# Patient Record
Sex: MALE | Race: WHITE | URBAN - METROPOLITAN AREA
[De-identification: names, ages, dates, MRNs, and addresses within clinical notes are randomized per-mention and may not be internally consistent; named-entity substitution may affect disease eponyms.]

---

## 2024-11-07 ENCOUNTER — APPOINTMENT (OUTPATIENT)
Dept: RADIOLOGY | Facility: HOSPITAL | Age: 59
End: 2024-11-07
Payer: COMMERCIAL

## 2024-11-07 ENCOUNTER — HOSPITAL ENCOUNTER (EMERGENCY)
Facility: HOSPITAL | Age: 59
Discharge: HOME | End: 2024-11-07
Attending: EMERGENCY MEDICINE
Payer: COMMERCIAL

## 2024-11-07 ENCOUNTER — APPOINTMENT (OUTPATIENT)
Dept: CARDIOLOGY | Facility: HOSPITAL | Age: 59
End: 2024-11-07
Payer: COMMERCIAL

## 2024-11-07 VITALS
SYSTOLIC BLOOD PRESSURE: 141 MMHG | TEMPERATURE: 97.2 F | BODY MASS INDEX: 2.8 KG/M2 | RESPIRATION RATE: 13 BRPM | HEIGHT: 71 IN | HEART RATE: 98 BPM | DIASTOLIC BLOOD PRESSURE: 76 MMHG | WEIGHT: 20 LBS | OXYGEN SATURATION: 94 %

## 2024-11-07 DIAGNOSIS — R07.9 LEFT-SIDED CHEST PAIN: Primary | ICD-10-CM

## 2024-11-07 DIAGNOSIS — J40 BRONCHITIS: ICD-10-CM

## 2024-11-07 DIAGNOSIS — R05.1 ACUTE COUGH: ICD-10-CM

## 2024-11-07 LAB
ALBUMIN SERPL BCP-MCNC: 5 G/DL (ref 3.4–5)
ALP SERPL-CCNC: 58 U/L (ref 33–120)
ALT SERPL W P-5'-P-CCNC: 45 U/L (ref 10–52)
ANION GAP SERPL CALC-SCNC: 14 MMOL/L (ref 10–20)
AST SERPL W P-5'-P-CCNC: 37 U/L (ref 9–39)
ATRIAL RATE: 86 BPM
BASOPHILS # BLD AUTO: 0.05 X10*3/UL (ref 0–0.1)
BASOPHILS NFR BLD AUTO: 0.7 %
BILIRUB SERPL-MCNC: 1.2 MG/DL (ref 0–1.2)
BNP SERPL-MCNC: 15 PG/ML (ref 0–99)
BUN SERPL-MCNC: 19 MG/DL (ref 6–23)
CALCIUM SERPL-MCNC: 10.6 MG/DL (ref 8.6–10.3)
CARDIAC TROPONIN I PNL SERPL HS: 6 NG/L (ref 0–20)
CARDIAC TROPONIN I PNL SERPL HS: 6 NG/L (ref 0–20)
CHLORIDE SERPL-SCNC: 94 MMOL/L (ref 98–107)
CO2 SERPL-SCNC: 29 MMOL/L (ref 21–32)
CREAT SERPL-MCNC: 0.88 MG/DL (ref 0.5–1.3)
EGFRCR SERPLBLD CKD-EPI 2021: >90 ML/MIN/1.73M*2
EOSINOPHIL # BLD AUTO: 0.08 X10*3/UL (ref 0–0.7)
EOSINOPHIL NFR BLD AUTO: 1.1 %
ERYTHROCYTE [DISTWIDTH] IN BLOOD BY AUTOMATED COUNT: 12.4 % (ref 11.5–14.5)
GLUCOSE SERPL-MCNC: 124 MG/DL (ref 74–99)
HCT VFR BLD AUTO: 49.7 % (ref 41–52)
HGB BLD-MCNC: 17.5 G/DL (ref 13.5–17.5)
IMM GRANULOCYTES # BLD AUTO: 0.02 X10*3/UL (ref 0–0.7)
IMM GRANULOCYTES NFR BLD AUTO: 0.3 % (ref 0–0.9)
INR PPP: 1 (ref 0.9–1.1)
LYMPHOCYTES # BLD AUTO: 1.68 X10*3/UL (ref 1.2–4.8)
LYMPHOCYTES NFR BLD AUTO: 23.5 %
MAGNESIUM SERPL-MCNC: 1.81 MG/DL (ref 1.6–2.4)
MCH RBC QN AUTO: 33.7 PG (ref 26–34)
MCHC RBC AUTO-ENTMCNC: 35.2 G/DL (ref 32–36)
MCV RBC AUTO: 96 FL (ref 80–100)
MONOCYTES # BLD AUTO: 0.96 X10*3/UL (ref 0.1–1)
MONOCYTES NFR BLD AUTO: 13.4 %
NEUTROPHILS # BLD AUTO: 4.36 X10*3/UL (ref 1.2–7.7)
NEUTROPHILS NFR BLD AUTO: 61 %
NRBC BLD-RTO: 0 /100 WBCS (ref 0–0)
P AXIS: 66 DEGREES
P OFFSET: 174 MS
P ONSET: 128 MS
PLATELET # BLD AUTO: 168 X10*3/UL (ref 150–450)
POTASSIUM SERPL-SCNC: 4.1 MMOL/L (ref 3.5–5.3)
PR INTERVAL: 184 MS
PROT SERPL-MCNC: 8.2 G/DL (ref 6.4–8.2)
PROTHROMBIN TIME: 11.2 SECONDS (ref 9.8–12.8)
Q ONSET: 220 MS
QRS COUNT: 14 BEATS
QRS DURATION: 82 MS
QT INTERVAL: 356 MS
QTC CALCULATION(BAZETT): 426 MS
QTC FREDERICIA: 401 MS
R AXIS: 60 DEGREES
RBC # BLD AUTO: 5.2 X10*6/UL (ref 4.5–5.9)
SODIUM SERPL-SCNC: 133 MMOL/L (ref 136–145)
T AXIS: 64 DEGREES
T OFFSET: 398 MS
VENTRICULAR RATE: 86 BPM
WBC # BLD AUTO: 7.2 X10*3/UL (ref 4.4–11.3)

## 2024-11-07 PROCEDURE — 83735 ASSAY OF MAGNESIUM: CPT | Performed by: EMERGENCY MEDICINE

## 2024-11-07 PROCEDURE — 85025 COMPLETE CBC W/AUTO DIFF WBC: CPT | Performed by: EMERGENCY MEDICINE

## 2024-11-07 PROCEDURE — 71045 X-RAY EXAM CHEST 1 VIEW: CPT

## 2024-11-07 PROCEDURE — 84075 ASSAY ALKALINE PHOSPHATASE: CPT | Performed by: EMERGENCY MEDICINE

## 2024-11-07 PROCEDURE — 2500000004 HC RX 250 GENERAL PHARMACY W/ HCPCS (ALT 636 FOR OP/ED): Performed by: REGISTERED NURSE

## 2024-11-07 PROCEDURE — 99284 EMERGENCY DEPT VISIT MOD MDM: CPT | Mod: 25

## 2024-11-07 PROCEDURE — 85610 PROTHROMBIN TIME: CPT | Performed by: EMERGENCY MEDICINE

## 2024-11-07 PROCEDURE — 83880 ASSAY OF NATRIURETIC PEPTIDE: CPT | Performed by: EMERGENCY MEDICINE

## 2024-11-07 PROCEDURE — 36415 COLL VENOUS BLD VENIPUNCTURE: CPT | Performed by: EMERGENCY MEDICINE

## 2024-11-07 PROCEDURE — 96374 THER/PROPH/DIAG INJ IV PUSH: CPT

## 2024-11-07 PROCEDURE — 96375 TX/PRO/DX INJ NEW DRUG ADDON: CPT

## 2024-11-07 PROCEDURE — 93005 ELECTROCARDIOGRAM TRACING: CPT

## 2024-11-07 PROCEDURE — 84484 ASSAY OF TROPONIN QUANT: CPT | Performed by: EMERGENCY MEDICINE

## 2024-11-07 PROCEDURE — 84484 ASSAY OF TROPONIN QUANT: CPT | Mod: 91 | Performed by: EMERGENCY MEDICINE

## 2024-11-07 PROCEDURE — 71045 X-RAY EXAM CHEST 1 VIEW: CPT | Performed by: RADIOLOGY

## 2024-11-07 RX ORDER — GUAIFENESIN 600 MG/1
1200 TABLET, EXTENDED RELEASE ORAL 2 TIMES DAILY
Qty: 20 TABLET | Refills: 0 | Status: SHIPPED | OUTPATIENT
Start: 2024-11-07 | End: 2024-11-12

## 2024-11-07 RX ORDER — METHYLPREDNISOLONE 4 MG/1
TABLET ORAL
Qty: 21 TABLET | Refills: 0 | Status: SHIPPED | OUTPATIENT
Start: 2024-11-07

## 2024-11-07 RX ORDER — ALBUTEROL SULFATE 90 UG/1
1-2 INHALANT RESPIRATORY (INHALATION) EVERY 6 HOURS PRN
Qty: 1 G | Refills: 0 | Status: SHIPPED | OUTPATIENT
Start: 2024-11-07 | End: 2024-12-07

## 2024-11-07 RX ORDER — IPRATROPIUM BROMIDE AND ALBUTEROL SULFATE 2.5; .5 MG/3ML; MG/3ML
3 SOLUTION RESPIRATORY (INHALATION) EVERY 20 MIN
Status: DISPENSED | OUTPATIENT
Start: 2024-11-07 | End: 2024-11-07

## 2024-11-07 RX ORDER — MORPHINE SULFATE 4 MG/ML
4 INJECTION, SOLUTION INTRAMUSCULAR; INTRAVENOUS ONCE
Status: COMPLETED | OUTPATIENT
Start: 2024-11-07 | End: 2024-11-07

## 2024-11-07 RX ORDER — OXYCODONE AND ACETAMINOPHEN 5; 325 MG/1; MG/1
1 TABLET ORAL EVERY 6 HOURS PRN
Qty: 3 TABLET | Refills: 0 | Status: SHIPPED | OUTPATIENT
Start: 2024-11-07 | End: 2024-11-10

## 2024-11-07 RX ORDER — AZITHROMYCIN 250 MG/1
TABLET, FILM COATED ORAL
Qty: 6 TABLET | Refills: 0 | Status: SHIPPED | OUTPATIENT
Start: 2024-11-07 | End: 2024-11-12

## 2024-11-07 RX ORDER — ONDANSETRON HYDROCHLORIDE 2 MG/ML
4 INJECTION, SOLUTION INTRAVENOUS ONCE
Status: COMPLETED | OUTPATIENT
Start: 2024-11-07 | End: 2024-11-07

## 2024-11-07 ASSESSMENT — PAIN DESCRIPTION - PAIN TYPE: TYPE: ACUTE PAIN

## 2024-11-07 ASSESSMENT — PAIN SCALES - GENERAL: PAINLEVEL_OUTOF10: 5 - MODERATE PAIN

## 2024-11-07 ASSESSMENT — HEART SCORE
HISTORY: SLIGHTLY SUSPICIOUS
RISK FACTORS: >2 RISK FACTORS OR HX OF ATHEROSCLEROTIC DISEASE
HEART SCORE: 3
AGE: 45-64
ECG: NORMAL
TROPONIN: LESS THAN OR EQUAL TO NORMAL LIMIT

## 2024-11-07 ASSESSMENT — PAIN DESCRIPTION - ONSET: ONSET: SUDDEN

## 2024-11-07 ASSESSMENT — COLUMBIA-SUICIDE SEVERITY RATING SCALE - C-SSRS
6. HAVE YOU EVER DONE ANYTHING, STARTED TO DO ANYTHING, OR PREPARED TO DO ANYTHING TO END YOUR LIFE?: NO
1. IN THE PAST MONTH, HAVE YOU WISHED YOU WERE DEAD OR WISHED YOU COULD GO TO SLEEP AND NOT WAKE UP?: NO
2. HAVE YOU ACTUALLY HAD ANY THOUGHTS OF KILLING YOURSELF?: NO

## 2024-11-07 ASSESSMENT — PAIN DESCRIPTION - FREQUENCY: FREQUENCY: INTERMITTENT

## 2024-11-07 ASSESSMENT — PAIN - FUNCTIONAL ASSESSMENT: PAIN_FUNCTIONAL_ASSESSMENT: 0-10

## 2024-11-07 ASSESSMENT — PAIN DESCRIPTION - ORIENTATION: ORIENTATION: LEFT

## 2024-11-07 ASSESSMENT — PAIN DESCRIPTION - LOCATION: LOCATION: CHEST

## 2024-11-07 ASSESSMENT — PAIN DESCRIPTION - DESCRIPTORS: DESCRIPTORS: STABBING

## 2024-11-07 ASSESSMENT — PAIN DESCRIPTION - PROGRESSION: CLINICAL_PROGRESSION: NOT CHANGED

## 2024-11-07 NOTE — Clinical Note
Ankur Sanchez was seen and treated in our emergency department on 11/7/2024.  He may return to work on 11/11/2024.       If you have any questions or concerns, please don't hesitate to call.      Karen Zaidi, APRN-CNP

## 2024-11-07 NOTE — PROGRESS NOTES
Respiratory Therapy Note  Patient refusing all breathing tx at this time, states he cannot cough the pain is too bad. Vital signs are good and patient is on room air with diminished breath sounds

## 2024-11-07 NOTE — ED PROVIDER NOTES
HPI   Chief Complaint   Patient presents with    Chest Pain     States he thinks he pulled a muscle in his chest.  Left sided chest pain.     59-year-old male with past medical history significant for hypertension, hyperlipidemia, diabetes, and 1.5 pack/day smoker presents emergency department today for evaluation of left-sided chest pain.  Patient tells me he was coughing on Monday when he started to feel a sharp burning sensation in the left side of his chest.  Patient tells me he is continued to experience this pain with movement, sneezing, and deep inhalation.  Patient denies fever or chills.  Patient tells me he has a moist nonproductive cough.  He tells me he feels like he cannot get the mucus out.  Patient denies shortness of breath.  Patient denies history of COPD and tells me he does not use inhalers or nebulizer treatments.  Patient currently rates his pain at a 5/10 on the pain scale.  Patient denies headache, dizziness or lightheadedness, numbness or tingling, abdominal pain, nausea vomiting or diarrhea.      History provided by:  Patient and spouse   used: No            Patient History   Past Medical History:   Diagnosis Date    Diabetes mellitus (Multi)     Hypertension      History reviewed. No pertinent surgical history.  No family history on file.  Social History     Tobacco Use    Smoking status: Every Day     Current packs/day: 2.00     Types: Cigarettes    Smokeless tobacco: Never   Vaping Use    Vaping status: Never Used   Substance Use Topics    Alcohol use: Defer    Drug use: Never       Physical Exam   ED Triage Vitals [11/07/24 0931]   Temperature Heart Rate Respirations BP   36.2 °C (97.2 °F) 85 20 147/85      Pulse Ox Temp Source Heart Rate Source Patient Position   98 % Temporal Monitor Sitting      BP Location FiO2 (%)     Right arm --       Physical Exam  Vitals and nursing note reviewed.   Constitutional:       Appearance: He is well-developed.   HENT:      Head:  Normocephalic and atraumatic.   Eyes:      Extraocular Movements: Extraocular movements intact.      Pupils: Pupils are equal, round, and reactive to light.   Cardiovascular:      Rate and Rhythm: Normal rate and regular rhythm.   Pulmonary:      Effort: Pulmonary effort is normal.      Breath sounds: Decreased breath sounds and rhonchi present.   Musculoskeletal:         General: Normal range of motion.      Cervical back: Normal range of motion and neck supple.   Skin:     General: Skin is warm and dry.      Capillary Refill: Capillary refill takes less than 2 seconds.   Neurological:      General: No focal deficit present.      Mental Status: He is alert and oriented to person, place, and time.   Psychiatric:         Mood and Affect: Mood normal.         Behavior: Behavior normal.           ED Course & MDM   Diagnoses as of 11/07/24 1228   Left-sided chest pain   Acute cough   Bronchitis                 No data recorded     [place] Coma Scale Score: 15 (11/07/24 0922 : Chuck Nguyen RN) HEART Score: 3 (11/07/24 1117 : ISELA Castro-JESS)                         Medical Decision Making    Patient seen exam emergency department; patient is nontoxic in appearance and does not appear to be any acute distress.  Patient is alert and oriented x 4.  Patient is neurologically intact without any focal deficits.  Patient's lung sounds are diminished throughout with scattered rhonchi in the bases.  Patient's heart regular rate and rhythm without any murmur appreciated.  Patient's abdomen is soft round and nontender.  Patient is afebrile not tachycardic.    Given the location of patient's pain and his medical history we will order chest x-ray, EKG and troponin to rule out acute ACS.  X-ray will also evaluate for pneumonia.  Given that patient's lungs are with rhonchi and diminished will order nebulizing treatments.  Additionally patient given IV morphine and IV Zofran for his pain.    EKG at 09:26 with  ventricular rate of 86, as interpreted by me, shows a normal rate and rhythm, normal axis, normal intervals. And normal ST and T wave pattern with no evidence of acute ischemia or other acute findings    Patient's high sensitive troponins are negative x 2.  Patient's heart score is 3.  BNP is 15, CMP significant for slight hyponatremia with a sodium of 133.  Mag is 1.81.  PT/INR is 11.2/1.  CBC is unremarkable on my review.  Chest x-ray is without evidence of acute cardiopulmonary processes.    Upon reevaluation patient is still complaining of discomfort in his chest with movement.  Patient refused his DuoNebs stating that that would make him cough which worsens his pain.  Patient I discussed that his symptoms today are multifactorial.  I do think that he pulled a muscle coughing or sneezing in his chest however I do feel that he has an undiagnosed COPD.  Patient I discussed treatment of this at home which will include a Medrol Dosepak, albuterol inhaler, Mucinex, and azithromycin.  Additionally I will give him a short course of Percocet for pain management at home.  I did recommend that he reach out to his primary care physician for reevaluation next week.  We also discussed reasons to return to the emergency department which she verbalized understanding of.  Patient provided with work excuse for today and tomorrow.  All patient's questions and concerns were addressed prior to discharge.  Patient discharged home in stable condition.        Shared KENNEY Attestation:    This patient was seen by the advanced practice provider.  I personally saw the patient and made/approved the management plan and take responsibility for the patient management.    History: 59-year-old male presents with chest pain.    Exam: Regular rate and rhythm cardiac exam with clear breath sounds bilaterally.  Abdomen is soft and nontender.  There is mild tenderness to palpation across the chest.  Negative Homans' sign bilaterally.  Neurological  exam is grossly intact.    MDM: ACS, arrhythmia, pneumonia, muscle strain, GERD    Labs Reviewed   COMPREHENSIVE METABOLIC PANEL - Abnormal       Result Value    Glucose 124 (*)     Sodium 133 (*)     Potassium 4.1      Chloride 94 (*)     Bicarbonate 29      Anion Gap 14      Urea Nitrogen 19      Creatinine 0.88      eGFR >90      Calcium 10.6 (*)     Albumin 5.0      Alkaline Phosphatase 58      Total Protein 8.2      AST 37      Bilirubin, Total 1.2      ALT 45     MAGNESIUM - Normal    Magnesium 1.81     B-TYPE NATRIURETIC PEPTIDE - Normal    BNP 15      Narrative:        <100 pg/mL - Heart failure unlikely  100-299 pg/mL - Intermediate probability of acute heart                  failure exacerbation. Correlate with clinical                  context and patient history.    >=300 pg/mL - Heart Failure likely. Correlate with clinical                  context and patient history.    BNP testing is performed using different testing methodology at [facility] than at other [facility]. Direct result comparisons should only be made within the same method.      PROTIME-INR - Normal    Protime 11.2      INR 1.0     SERIAL TROPONIN-INITIAL - Normal    Troponin I, High Sensitivity 6      Narrative:     Less than 99th percentile of normal range cutoff-  Female and children under 18 years old <14 ng/L; Male <21 ng/L: Negative  Repeat testing should be performed if clinically indicated.     Female and children under 18 years old 14-50 ng/L; Male 21-50 ng/L:  Consistent with possible cardiac damage and possible increased clinical   risk. Serial measurements may help to assess extent of myocardial damage.     >50 ng/L: Consistent with cardiac damage, increased clinical risk and  myocardial infarction. Serial measurements may help assess extent of   myocardial damage.      NOTE: Children less than 1 year old may have higher baseline troponin   levels and results should be interpreted in conjunction with the  overall   clinical context.     NOTE: Troponin I testing is performed using a different   testing methodology at [facility] than at other   [facility]. Direct result comparisons should only   be made within the same method.   SERIAL TROPONIN, 1 HOUR - Normal    Troponin I, High Sensitivity 6      Narrative:     Less than 99th percentile of normal range cutoff-  Female and children under 18 years old <14 ng/L; Male <21 ng/L: Negative  Repeat testing should be performed if clinically indicated.     Female and children under 18 years old 14-50 ng/L; Male 21-50 ng/L:  Consistent with possible cardiac damage and possible increased clinical   risk. Serial measurements may help to assess extent of myocardial damage.     >50 ng/L: Consistent with cardiac damage, increased clinical risk and  myocardial infarction. Serial measurements may help assess extent of   myocardial damage.      NOTE: Children less than 1 year old may have higher baseline troponin   levels and results should be interpreted in conjunction with the overall   clinical context.     NOTE: Troponin I testing is performed using a different   testing methodology at [facility] than at other   [facility]. Direct result comparisons should only   be made within the same method.   TROPONIN SERIES- (INITIAL, 1 HR)    Narrative:     The following orders were created for panel order Troponin I Series, High Sensitivity (0, 1 HR).  Procedure                               Abnormality         Status                     ---------                               -----------         ------                     Troponin I, High Sensiti...[[id number]]  Normal              Final result               Troponin, High Sensitivi...[[id number]]  Normal              Final result                 Please view results for these tests on the individual orders.   CBC WITH AUTO DIFFERENTIAL    WBC 7.2      nRBC 0.0      RBC 5.20      Hemoglobin 17.5       "Hematocrit 49.7      MCV 96      MCH 33.7      MCHC 35.2      RDW 12.4      Platelets 168      Neutrophils % 61.0      Immature Granulocytes %, Automated 0.3      Lymphocytes % 23.5      Monocytes % 13.4      Eosinophils % 1.1      Basophils % 0.7      Neutrophils Absolute 4.36      Immature Granulocytes Absolute, Automated 0.02      Lymphocytes Absolute 1.68      Monocytes Absolute 0.96      Eosinophils Absolute 0.08      Basophils Absolute 0.05         XR chest 1 view   Final Result   1.  No evidence of acute cardiopulmonary process.                  MACRO:   None        Signed by: Latisha Thompson 11/7/2024 11:08 AM   Dictation workstation:   [id number]        My interpretation of EKG is normal sinus rhythm 86 bpm with no acute ST-T changes    I utilized an evidence-based risk rating tool (CMT) along with my training and experience to weigh the risk of discharge against the risks of further testing, imaging, or hospitalization. At this time I estimate the risks of additional testing, imaging, or hospitalization to be equal to or greater than the risk of discharge. I discussed my risk assessment with the patient and the patient consents to the risk of discharge as well as the risk of uncertainty in estimating outcomes.    The patient's HEART Score is <4. In rare cases, I give patients with HEART Score of 4 the option of discharge, but only when they meet criteria for \"Low 4,\" meaning that HST was used, and the 4 is not from a highly suspicious story, highly suspicious EKG, or positive cardiac enzymes. In these selected cases, the risk of a \"Low 4\" is still most likely lower than the risk of admission and further testing/imaging. [id number]          I have seen and examined the patient, agree with the workup, evaluation, medical decision making, management and diagnosis.  The care plan has been discussed.    Cricket Estevez MD      Procedure  Procedures     Karen Zaidi, APRN-CNP  11/07/24 1233    "

## 2024-11-15 LAB
ATRIAL RATE: 86 BPM
P AXIS: 66 DEGREES
P OFFSET: 174 MS
P ONSET: 128 MS
PR INTERVAL: 184 MS
Q ONSET: 220 MS
QRS COUNT: 14 BEATS
QRS DURATION: 82 MS
QT INTERVAL: 356 MS
QTC CALCULATION(BAZETT): 426 MS
QTC FREDERICIA: 401 MS
R AXIS: 60 DEGREES
T AXIS: 64 DEGREES
T OFFSET: 398 MS
VENTRICULAR RATE: 86 BPM

## 2025-04-18 ENCOUNTER — APPOINTMENT (OUTPATIENT)
Dept: RADIOLOGY | Facility: HOSPITAL | Age: 60
End: 2025-04-18
Payer: COMMERCIAL

## 2025-04-18 ENCOUNTER — HOSPITAL ENCOUNTER (EMERGENCY)
Facility: HOSPITAL | Age: 60
Discharge: HOME | End: 2025-04-18
Attending: EMERGENCY MEDICINE
Payer: COMMERCIAL

## 2025-04-18 VITALS
WEIGHT: 185 LBS | HEART RATE: 109 BPM | TEMPERATURE: 97.9 F | BODY MASS INDEX: 25.9 KG/M2 | DIASTOLIC BLOOD PRESSURE: 88 MMHG | RESPIRATION RATE: 20 BRPM | HEIGHT: 71 IN | OXYGEN SATURATION: 96 % | SYSTOLIC BLOOD PRESSURE: 155 MMHG

## 2025-04-18 DIAGNOSIS — S93.325A DISLOCATION OF TARSOMETATARSAL JOINT OF LEFT FOOT, INITIAL ENCOUNTER: Primary | ICD-10-CM

## 2025-04-18 PROCEDURE — 73630 X-RAY EXAM OF FOOT: CPT | Mod: LT

## 2025-04-18 PROCEDURE — 96372 THER/PROPH/DIAG INJ SC/IM: CPT | Performed by: EMERGENCY MEDICINE

## 2025-04-18 PROCEDURE — 73700 CT LOWER EXTREMITY W/O DYE: CPT | Mod: LT

## 2025-04-18 PROCEDURE — 73610 X-RAY EXAM OF ANKLE: CPT | Mod: LT

## 2025-04-18 PROCEDURE — 99284 EMERGENCY DEPT VISIT MOD MDM: CPT | Performed by: EMERGENCY MEDICINE

## 2025-04-18 PROCEDURE — 73700 CT LOWER EXTREMITY W/O DYE: CPT | Mod: LEFT SIDE | Performed by: RADIOLOGY

## 2025-04-18 PROCEDURE — 29515 APPLICATION SHORT LEG SPLINT: CPT | Mod: LT

## 2025-04-18 PROCEDURE — 2500000004 HC RX 250 GENERAL PHARMACY W/ HCPCS (ALT 636 FOR OP/ED): Mod: JZ | Performed by: EMERGENCY MEDICINE

## 2025-04-18 PROCEDURE — 2500000001 HC RX 250 WO HCPCS SELF ADMINISTERED DRUGS (ALT 637 FOR MEDICARE OP): Performed by: EMERGENCY MEDICINE

## 2025-04-18 PROCEDURE — 73610 X-RAY EXAM OF ANKLE: CPT | Mod: LEFT SIDE | Performed by: RADIOLOGY

## 2025-04-18 PROCEDURE — 73630 X-RAY EXAM OF FOOT: CPT | Mod: LEFT SIDE | Performed by: RADIOLOGY

## 2025-04-18 RX ORDER — OXYCODONE AND ACETAMINOPHEN 5; 325 MG/1; MG/1
1 TABLET ORAL EVERY 6 HOURS PRN
Qty: 5 TABLET | Refills: 0 | Status: SHIPPED | OUTPATIENT
Start: 2025-04-18 | End: 2025-04-21

## 2025-04-18 RX ORDER — MORPHINE SULFATE 4 MG/ML
4 INJECTION, SOLUTION INTRAMUSCULAR; INTRAVENOUS ONCE
Status: COMPLETED | OUTPATIENT
Start: 2025-04-18 | End: 2025-04-18

## 2025-04-18 RX ORDER — HYDROCODONE BITARTRATE AND ACETAMINOPHEN 5; 325 MG/1; MG/1
1 TABLET ORAL ONCE
Refills: 0 | Status: COMPLETED | OUTPATIENT
Start: 2025-04-18 | End: 2025-04-18

## 2025-04-18 RX ADMIN — MORPHINE SULFATE 4 MG: 4 INJECTION, SOLUTION INTRAMUSCULAR; INTRAVENOUS at 09:14

## 2025-04-18 RX ADMIN — HYDROCODONE BITARTRATE AND ACETAMINOPHEN 1 TABLET: 5; 325 TABLET ORAL at 07:49

## 2025-04-18 ASSESSMENT — PAIN DESCRIPTION - DESCRIPTORS
DESCRIPTORS: POUNDING

## 2025-04-18 ASSESSMENT — PAIN DESCRIPTION - PAIN TYPE
TYPE: ACUTE PAIN

## 2025-04-18 ASSESSMENT — LIFESTYLE VARIABLES
HAVE PEOPLE ANNOYED YOU BY CRITICIZING YOUR DRINKING: NO
TOTAL SCORE: 1
EVER HAD A DRINK FIRST THING IN THE MORNING TO STEADY YOUR NERVES TO GET RID OF A HANGOVER: NO
HAVE YOU EVER FELT YOU SHOULD CUT DOWN ON YOUR DRINKING: YES
EVER FELT BAD OR GUILTY ABOUT YOUR DRINKING: NO

## 2025-04-18 ASSESSMENT — PAIN DESCRIPTION - LOCATION
LOCATION: FOOT
LOCATION: ANKLE

## 2025-04-18 ASSESSMENT — PAIN DESCRIPTION - ORIENTATION
ORIENTATION: LEFT

## 2025-04-18 ASSESSMENT — PAIN SCALES - GENERAL
PAINLEVEL_OUTOF10: 10 - WORST POSSIBLE PAIN
PAINLEVEL_OUTOF10: 9
PAINLEVEL_OUTOF10: 10 - WORST POSSIBLE PAIN
PAINLEVEL_OUTOF10: 8

## 2025-04-18 ASSESSMENT — PAIN - FUNCTIONAL ASSESSMENT
PAIN_FUNCTIONAL_ASSESSMENT: 0-10

## 2025-04-18 ASSESSMENT — PAIN DESCRIPTION - PROGRESSION
CLINICAL_PROGRESSION: NOT CHANGED
CLINICAL_PROGRESSION: GRADUALLY IMPROVING

## 2025-04-18 NOTE — ED PROVIDER NOTES
HPI   Chief Complaint   Patient presents with    Foot Injury     Pt was getting out of bed last time and got tangled up with his dog that was on the floor and pt ended up falling down stating that his foot went a different way when falling down. -blood thinner, -loc. States hx of neuropathy        HPI: 59-year-old male history of diabetes and neuropathy states that he went to get out of bed and he tripped over his dog and rolled his left ankle.  He is complaining of left lateral ankle pain.  He did not hit his head.  No LOC.  No neck or back pain.  No blood thinners.    Family HX: Denies any significant/pertinent family history.  Social Hx: Denies ETOH or drug use.  Review of systems:  Gen.: No weight loss, fatigue, anorexia, insomnia, fever.   Eyes: No vision loss, double vision, drainage, eye pain.   ENT: No pharyngitis, dry mouth.   Cardiac: No chest pain, palpitations, syncope, near syncope.   Pulmonary: No shortness of breath, cough, hemoptysis.   Heme/lymph: No swollen glands, fever, bleeding.   GI: No abdominal pain, change in bowel habits, melena, hematemesis, hematochezia, nausea, vomiting, diarrhea.   : No discharge, dysuria, frequency, urgency, hematuria.   Musculoskeletal: No limb pain, joint pain, joint swelling.   Skin: No rashes.   Psych: No depression, anxiety, suicidality, homicidality.   Review of systems is otherwise negative unless stated above or in history of present illness.    Physical Exam:    Appearance: Alert, oriented , cooperative,  in no acute distress. Well nourished & well hydrated.    Skin: Intact,  dry skin, no lesions, rash, petechiae or purpura.     Eyes: PERRLA, EOMs intact,  Conjunctiva pink with no redness or exudates. Eyelids without lesions. No scleral icterus.     ENT: Hearing grossly intact. External auditory canals patent, tympanic membranes intact with visible landmarks. Nares patent, mucus membranes moist. Dentition without lesions. Pharynx clear, uvula midline.      Neck: Supple, without meningismus. Thyroid not palpable. Trachea at midline. No lymphadenopathy.    Pulmonary: Clear bilaterally with good chest wall excursion. No rales, rhonchi or wheezing. No accessory muscle use or stridor.    Cardiac: Normal S1, S2 without murmur, rub, gallop or extrasystole. No JVD, Carotids without bruits.    Abdomen: Soft, nontender, active bowel sounds.  No palpable organomegaly.  No rebound or guarding.  No CVA tenderness.    Genitourinary: Exam deferred.    Musculoskeletal:  no deformity. Pulses full and equal. No cyanosis, clubbing, Left ankle with edema about the lateral malleolus.  2+ pulses.  Skin is pink    Neurological:  Cranial nerves II through XII are grossly intact, finger-nose touch is normal, normal sensation, no weakness, no focal findings identified.    Psychiatric: Appropriate mood and affect.     Medical Decision-Making:  Testing: Patient was given Norco for pain.  We will obtain x-rays of the foot and ankle to rule out fracture versus dislocation.  X-ray was suggesting a Lisfranc injury.  They recommended a CT.  CT was obtained which does show a Lisfranc fracture/dislocation.  Patient was medicated for pain with Norco and then with subcu morphine.  Patient was discussed with Dr. Villar with podiatry.  Patient was placed in a short leg posterior splint.  He requested a walker.  Patient was given pain medication with sedation precaution.  Patient will be followed up in Dr. Chanel's office.  No other injuries were noted.  Patient did have a short leg posterior splint placed.  This was placed by the nursing staff.  Patient was neurovascularly intact before and after splint placement  Treatment:   Reevaluation:   Plan: Homegoing. Discussed differential. Will follow-up with the primary physician in the next 2-3 days. Return if worse. They understand return precautions and discharge instructions. Patient and family/friend/caregiver are in agreement with this plan.    Impression:   1.  Lisfranc injury  2.                  Patient History   Medical History[1]  Surgical History[2]  Family History[3]  Social History[4]    Physical Exam   ED Triage Vitals [04/18/25 0702]   Temperature Heart Rate Respirations BP   36.6 °C (97.9 °F) (!) 109 20 155/88      Pulse Ox Temp Source Heart Rate Source Patient Position   96 % Temporal -- Sitting      BP Location FiO2 (%)     Right arm --       Physical Exam      ED Course & MDM   Diagnoses as of 04/18/25 1420   Dislocation of tarsometatarsal joint of left foot, initial encounter                 No data recorded     [place] Coma Scale Score: 15 (04/18/25 0706 : Monica Rodrigez RN)                           Medical Decision Making      Procedure  Procedures       [1]   Past Medical History:  Diagnosis Date    Diabetes mellitus (Multi)     Hypertension    [2] No past surgical history on file.  [3] No family history on file.  [4]   Social History  Tobacco Use    Smoking status: Every Day     Current packs/day: 2.00     Types: Cigarettes    Smokeless tobacco: Never   Vaping Use    Vaping status: Never Used   Substance Use Topics    Alcohol use: Defer    Drug use: Never        Angelina Jaime MD  04/18/25 1420

## 2025-04-18 NOTE — ED PROCEDURE NOTE
Procedure  Orthopaedic Injury Treatment - Lower Extremity    Performed by: Angelina Jaime MD  Authorized by: Angelina Jaime MD    Consent:     Consent obtained:  Verbal    Consent given by:  Patient    Risks discussed:  Fracture    Alternatives discussed:  No treatment  Universal protocol:     Imaging studies available: yes      Patient identity confirmed:  Verbally with patient, hospital-assigned identification number and arm band  Location:     Injury location: foot.  Pre-procedure details:     Distal perfusion: normal    Sedation:     Sedation type:  None  Procedure details:     Manipulation performed: no      Immobilization:  Splint    Splint type:  Short leg    Supplies used:  Plaster and cotton padding  Post-procedure details:     Neurological function: normal      Distal perfusion: normal      Range of motion: improved      Procedure completion:  Tolerated               Angelina Jaime MD  04/18/25 1007

## 2025-04-21 ENCOUNTER — OFFICE VISIT (OUTPATIENT)
Dept: ORTHOPEDIC SURGERY | Facility: CLINIC | Age: 60
End: 2025-04-21
Payer: COMMERCIAL

## 2025-04-21 DIAGNOSIS — S93.325A LISFRANC DISLOCATION, LEFT, INITIAL ENCOUNTER: ICD-10-CM

## 2025-04-21 PROCEDURE — 99203 OFFICE O/P NEW LOW 30 MIN: CPT | Performed by: INTERNAL MEDICINE

## 2025-04-21 PROCEDURE — 99213 OFFICE O/P EST LOW 20 MIN: CPT | Performed by: INTERNAL MEDICINE

## 2025-04-21 RX ORDER — OXYCODONE AND ACETAMINOPHEN 5; 325 MG/1; MG/1
1 TABLET ORAL EVERY 8 HOURS PRN
Qty: 20 TABLET | Refills: 0 | Status: SHIPPED | OUTPATIENT
Start: 2025-04-21 | End: 2025-04-28

## 2025-04-21 NOTE — LETTER
April 21, 2025     Patient: Ankur Sanchez   YOB: 1965   Date of Visit: 4/21/2025       To Whom It May Concern:    It is my medical opinion that Ankur Sanchez should remain out of work until 1 Month .    If you have any questions or concerns, please don't hesitate to call.         Sincerely,         Ondina Larsen MD

## 2025-04-21 NOTE — PROGRESS NOTES
Acute Injury New Patient Visit    CC: No chief complaint on file.      HPI: Ankur is a 59 y.o. male presents today for evaluation for acute left ankle/foot injury sustained 3 days ago after he tripped over his dog and fell and injured his left foot. He was evaluated in the ER where x-rays and CT scan were taken. He is here for initial evaluation.  He was placed into a splint.        Review of Systems   GENERAL: Negative for malaise, significant weight loss, fever  MUSCULOSKELETAL: See HPI  NEURO:  Negative for numbness / tingling     Past Medical History  Medical History[1]    Medication review  Medication Documentation Review Audit       Reviewed by Chuck Nguyen RN (Registered Nurse) on 11/07/24 at 0929      Medication Order Taking? Sig Documenting Provider Last Dose Status            No Medications to Display                                   Allergies  RX Allergies[2]    Social History  Social History     Socioeconomic History    Marital status:      Spouse name: Not on file    Number of children: Not on file    Years of education: Not on file    Highest education level: Not on file   Occupational History    Not on file   Tobacco Use    Smoking status: Every Day     Current packs/day: 2.00     Types: Cigarettes    Smokeless tobacco: Never   Vaping Use    Vaping status: Never Used   Substance and Sexual Activity    Alcohol use: Defer    Drug use: Never    Sexual activity: Defer   Other Topics Concern    Not on file   Social History Narrative    Not on file     Social Drivers of Health     Financial Resource Strain: Not on file   Food Insecurity: Not on file   Transportation Needs: Not on file   Physical Activity: Not on file   Stress: Not on file   Social Connections: Not on file   Intimate Partner Violence: Not on file   Housing Stability: Not on file       Surgical History  Surgical History[3]    Physical Exam:  GENERAL:  Patient is awake, alert, and oriented to person place and time.  Patient  appears well nourished and well kept.  Affect Calm, Not Acutely Distressed.  HEENT:  Normocephalic, Atraumatic, EOMI  CARDIOVASCULAR:  Hemodynamically stable.  RESPIRATORY:  Normal respirations with unlabored breathing.  Extremity: Left foot and ankle shows posterior leg splint intact.  Distal pulses are palpable.  Satisfactory capillary refill time.  He is neurovascular intact.    Diagnostics: X-rays and CT scan reviewed  CT foot left wo IV contrast  Narrative: Interpreted By:  Vladislav Akers,   STUDY:  CT FOOT LEFT WO IV CONTRAST;  4/18/2025 8:54 am      INDICATION:  Signs/Symptoms:r/o fracture.      COMPARISON:  Plain films of the same day      ACCESSION NUMBER(S):  [id number]      ORDERING CLINICIAN:  MILLIE HERNANDEZ      TECHNIQUE:  Helical trans axial images were obtained with additional orthogonal  reconstructions with bone technique.      FINDINGS:  Bony structures:  Oblique fracture is present at the base of the 2nd  metatarsal, as well as a small avulsion fracture anteriorly to the  web interspace between the base of the 2nd metatarsal and medial  cuneiform. There also appears to be slight widening at the Lisfranc  joint, indicating ligamentous injury and Lisfranc  fracture/dislocation. There are also nondisplaced fractures at the  plantar bases of the 3rd and 4th metatarsals. A small avulsion  fracture is also seen between the bases of the 2nd and 3rd  metatarsals.      Joint spaces:  The remaining joint spaces are maintained.      Tendons and ligaments:  Maintained as visualized.      Musculature and fascia:  Maintained.      Subcutaneous tissue:  Unremarkable without significant edema.      Vasculature:  No significant atherosclerosis.      ADDITIONAL FINDINGS:  None significant      Impression: Lisfranc fracture/dislocation as described.      Signed by: Vladislav Akers 4/18/2025 9:37 AM  Dictation workstation:   [id number]  XR ankle left 3+ views, XR foot left 3+ views  Narrative: Interpreted By:  Delphine  Vladislav,   STUDY:  XR ANKLE LEFT 3+ VIEWS; XR FOOT LEFT 3+ VIEWS;  4/18/2025 8:01 am      INDICATION:  Signs/Symptoms:fall.      COMPARISON:  None.      ACCESSION NUMBER(S):  [id number]; [id number]      ORDERING CLINICIAN:  MILLIE HERNANDEZ      FINDINGS:  Bony structures:  There is slight widening between the base of the  2nd metatarsal and medial cuneiform, with a linear ossification at  the space probably due to a avulsion fracture. There is also faint  transverse lucencies at the bases of the 2nd and 3rd metatarsals. The  findings would be suspicious for Lisfranc fracture/dislocation. CT  would be recommended if further evaluation and confirmation is  needed. The remaining bony structures appear intact.      Joint spaces:  The remaining joint spaces are maintained.      Soft tissues:  There is soft tissue fullness at the ankle,  particularly laterally indicating edema and probably ankle sprain.      Other:  None significant      Impression: Lisfranc fracture-dislocation.  Probable ankle sprain.      MACRO:  None      Signed by: Vladislav Akers 4/18/2025 8:14 AM  Dictation workstation:   [id number]      Procedure: None    Assessment: Acute Lisfranc fracture of the left foot    Plan: Ankur presents today for evaluation for acute left ankle/foot injury sustained 3 days ago after he tripped over his dog and fell.  Due to the unstable fracture pattern, recommended follow-up with orthopedics foot and ankle for further evaluation and treatment.  He will continue his posterior leg splint, strict nonweightbearing until follow-up.  We did refill his pain medication.  He will be off of work until further notice.      No orders of the defined types were placed in this encounter.     At the conclusion of the visit there were no further questions by the patient/family regarding their plan of care.  Patient was instructed to call or return with any issues, questions, or concerns regarding their injury and/or treatment plan  described above.     04/21/25 at 3:52 PM - Ondina Larsen MD  Scribe Attestation  By signing my name below, I, Geoffrey Zoë Scribfeli   attest that this documentation has been prepared under the direction and in the presence of Ondina Larsen MD.    Office: [phone]    This note was prepared using voice recognition software.  The details of this note are correct and have been reviewed, and corrected to the best of my ability.  Some grammatical errors may persist related to the Dragon software.         [1]   Past Medical History:  Diagnosis Date    Diabetes mellitus (Multi)     Hypertension    [2] No Known Allergies  [3] No past surgical history on file.

## 2025-04-24 ENCOUNTER — OFFICE VISIT (OUTPATIENT)
Dept: ORTHOPEDIC SURGERY | Facility: CLINIC | Age: 60
End: 2025-04-24
Payer: COMMERCIAL

## 2025-04-24 DIAGNOSIS — E11.9 DIABETES MELLITUS WITHOUT COMPLICATION: ICD-10-CM

## 2025-04-24 DIAGNOSIS — Z72.0 TOBACCO USE: Primary | ICD-10-CM

## 2025-04-24 DIAGNOSIS — S93.325A LISFRANC DISLOCATION, LEFT, INITIAL ENCOUNTER: ICD-10-CM

## 2025-04-24 PROCEDURE — 99406 BEHAV CHNG SMOKING 3-10 MIN: CPT | Performed by: STUDENT IN AN ORGANIZED HEALTH CARE EDUCATION/TRAINING PROGRAM

## 2025-04-24 PROCEDURE — 99214 OFFICE O/P EST MOD 30 MIN: CPT | Mod: 25 | Performed by: STUDENT IN AN ORGANIZED HEALTH CARE EDUCATION/TRAINING PROGRAM

## 2025-04-24 PROCEDURE — L4361 PNEUMA/VAC WALK BOOT PRE OTS: HCPCS | Performed by: STUDENT IN AN ORGANIZED HEALTH CARE EDUCATION/TRAINING PROGRAM

## 2025-04-24 PROCEDURE — 99204 OFFICE O/P NEW MOD 45 MIN: CPT | Performed by: STUDENT IN AN ORGANIZED HEALTH CARE EDUCATION/TRAINING PROGRAM

## 2025-04-24 ASSESSMENT — PAIN SCALES - GENERAL: PAINLEVEL_OUTOF10: 7

## 2025-04-24 ASSESSMENT — PAIN - FUNCTIONAL ASSESSMENT: PAIN_FUNCTIONAL_ASSESSMENT: 0-10

## 2025-04-24 NOTE — LETTER
April 24, 2025     Elvira Godoy MD  [address] [facility] [place] [address]    Patient: Ankur Sanchez   YOB: 1965   Date of Visit: 4/24/2025       Dear Dr. Elvira Godoy MD:    Thank you for referring Ankur Sanchez to me for evaluation. Below are my notes for this consultation.  If you have questions, please do not hesitate to call me. I look forward to following your patient along with you.       Sincerely,     Regan Lindquist MD      CC: Ondina Larsen MD  ______________________________________________________________________________________    ORTHOPAEDIC SURGERY OUTPATIENT PROGRESS NOTE    Chief Complaint:  Left foot pain    History Of Present Illness  Ankur Sanchez is a 59 y.o. male who presents for evaluation of left foot as a referral from Dr. Larsen.  Patient sustained an injury from 04/18/2025, he tripped after getting out of his bed with immediate pain and difficulty bearing weight in the left foot.  Patient carries a diagnosis of neuropathy with diabetes.  He was seen in an ER setting where imaging was obtained including x-ray and CT that was concerning for a Lisfranc variant injury.  He later followed up with an orthopedic provider and was referred for evaluation.  He has been nonweightbearing in his left lower extremity utilizing knee scooter for assisted ambulation.  He has been taking narcotics for pain control.    Patient has DM2 patient believes his last HbA1c was approximately 6% in spring she had to, 1 pack/day smoker, no current anticoagulation.    Past Medical History  Medical History[1]    Surgical History  Recent Surgeries in Orthopaedic Surgery            No cases to display             Social History  Social History[2]    Family History  Family History[3]     Allergies  RX Allergies[4]    Review of Systems  REVIEW OF SYSTEMS  Constitutional: no unplanned weight loss  Psychiatric: no suicidal ideation  ENT: no  vision changes, no sinus problems  Pulmonary: no shortness of breath  Lymphatic: no enlarged lymph nodes  Cardiovascular: no chest pain or shortness of breath  Gastrointestinal: no stomach problems  Genitourinary: no dysuria   Skin: no rashes  Endocrine: no thyroid problems  Neurological: no headache, no numbness  Hematological: no easy bruising  Musculoskeletal: Left foot pain     Physical Exam  PHYSICAL EXAMINATION  Constitutional Exam: well developed and well nourished  Psychiatric Exam: alert and oriented, appropriate mood and behavior  Eye Exam: EOMI  Pulmonary Exam: breathing non-labored, no apparent distress  Lymphatic exam: no appreciable lymphadenopathy in the lower extremities  Cardiovascular exam: RRR to peripheral palpation, DP pulses 2+, PT 2+, toes are pink with good capillary refill, no pitting edema  Skin exam: no open lesions, rashes, abrasions or ulcerations  Neurological exam: sensation to light touch intact in both lower extremities in peripheral and dermatomal distributions (except for any abnormalities noted in musculoskeletal exam)    Musculoskeletal exam: Left lower extremity examination.  Patient with diffuse dorsal foot edema without obvious skin wrinkling.  He has significant plantar ecchymosis.  Patient does have sensation grossly intact to light touch about the foot.  Midfoot ROM limited secondary to pain.  Patient has physiologic hindfoot valgus with neutral arch posture and no significant forefoot deformity noted.  Patient has intact PF/DF/EHL, pain limited. Patient has 2+ DP/PT pulse palpated.     Last Recorded Vitals  There were no vitals taken for this visit.    Laboratory Results  No results found for this or any previous visit (from the past 24 hours).     Radiology Results  X-ray and CT imaging reviewed from 4/18/2025 and independently evaluated by me on 4/24/2025 demonstrates second metatarsal base fracture with presumed Lisfranc avulsion type injury and widening of the C1-M2  interval, nondisplaced fracture of third metatarsal base as well as fourth metatarsal fracture concerning for Lisfranc variant injury.    Assessment/Plan:  59-year-old male 1 pack/day smoker with history of diabetes and concern for neuropathy who in my impression has left foot pain secondary to Lisfranc variant injury.  I have reviewed the diagnosis and treatment options extensively with the patient.  I would recommend the patient maintain strict nonweightbearing in his left lower extremity.  I discussed that this is a devastating injury to the eye that is unlikely that his foot or function the same as prior to injury or as compared to the contralateral side.  I would recommend strict elevation to allow for soft tissue rest prior to consideration for definitive surgery requiring a left foot stress examination under anesthesia with ORIF/arthrodesis as indicated.I discussed that this is a complicated clinical situation given both his history of diabetes as well as smoking.  I discussed that I do not routinely perform elective foot and ankle surgery for patients that are actively smoking due to the concern for infection, wound healing complications as well the potential for failure of bone healing.  I will provide him with a referral to our tobacco cessation counselors.  Without surgery, the patient will remain at risk of Charcot neuroarthropathy as well as arch collapse and development of posttraumatic osteoarthritis.  At this point, we will continue to follow his clinical examination and may consider a delayed primary arthrodesis.  I reviewed that I would not be able to make them a new or normal extremity. Risks included but are not limited to infection, wound healing complications, need for further surgery, persistent or worsening pain, postoperative stiffness, bleeding, blood loss requiring a blood transfusion, neurovascular injury, mal- or non-union, arthrodesis failure, recurrent deformity, hardware failure,  development of Charcot arthropathy, arch collapse or more progressive OA, hardware pain, failure of the procedure, complications of anesthesia, stroke, DVT/PE, myocardial infarction and death. Benefits included decreased pain, improve function and maintained alignment. Alternatives included conservative management which I would not recommend in this clinic setting. The patient voiced understanding of the risks, benefits and alternatives.  I will plan to see the patient next week for a skin check.  Upon return, patient would not require further imaging.    Regan Lindquist MD, HAZEL  Department of Orthopaedic Surgery  [facility]    The diagnosis and treatment plan were reviewed with the patient. All questions were answered. The patient verbalized understanding of the treatment plan. There were no barriers to understanding identified.    Note dictated with Pairin software.  Completed without full type editing and sent to avoid delay.         [1]  Past Medical History:  Diagnosis Date   • Diabetes mellitus (Multi)    • Hypertension    [2]  Social History  Socioeconomic History   • Marital status:    Tobacco Use   • Smoking status: Every Day     Current packs/day: 2.00     Types: Cigarettes   • Smokeless tobacco: Never   Vaping Use   • Vaping status: Never Used   Substance and Sexual Activity   • Alcohol use: Defer   • Drug use: Never   • Sexual activity: Defer   [3]  No family history on file.  [4]  No Known Allergies       [1]  Past Medical History:  Diagnosis Date   • Diabetes mellitus (Multi)    • Hypertension    [2]  Social History  Socioeconomic History   • Marital status:    Tobacco Use   • Smoking status: Every Day     Current packs/day: 2.00     Types: Cigarettes   • Smokeless tobacco: Never   Vaping Use   • Vaping status: Never Used   Substance and Sexual Activity   • Alcohol use: Defer   • Drug use: Never   • Sexual activity: Defer    [3]  No family history on file.  [4]  No Known Allergies

## 2025-04-24 NOTE — PROGRESS NOTES
ORTHOPAEDIC SURGERY OUTPATIENT PROGRESS NOTE    Chief Complaint:  Left foot pain    History Of Present Illness  Ankur Sanchez is a 59 y.o. male who presents for evaluation of left foot as a referral from Dr. Larsne.  Patient sustained an injury from 04/18/2025, he tripped after getting out of his bed with immediate pain and difficulty bearing weight in the left foot.  Patient carries a diagnosis of neuropathy with diabetes.  He was seen in an ER setting where imaging was obtained including x-ray and CT that was concerning for a Lisfranc variant injury.  He later followed up with an orthopedic provider and was referred for evaluation.  He has been nonweightbearing in his left lower extremity utilizing knee scooter for assisted ambulation.  He has been taking narcotics for pain control.    Patient has DM2 patient believes his last HbA1c was approximately 6% in spring she had to, 1 pack/day smoker, no current anticoagulation.    Past Medical History  Medical History[1]    Surgical History  Recent Surgeries in Orthopaedic Surgery            No cases to display             Social History  Social History[2]    Family History  Family History[3]     Allergies  RX Allergies[4]    Review of Systems  REVIEW OF SYSTEMS  Constitutional: no unplanned weight loss  Psychiatric: no suicidal ideation  ENT: no vision changes, no sinus problems  Pulmonary: no shortness of breath  Lymphatic: no enlarged lymph nodes  Cardiovascular: no chest pain or shortness of breath  Gastrointestinal: no stomach problems  Genitourinary: no dysuria   Skin: no rashes  Endocrine: no thyroid problems  Neurological: no headache, no numbness  Hematological: no easy bruising  Musculoskeletal: Left foot pain     Physical Exam  PHYSICAL EXAMINATION  Constitutional Exam: well developed and well nourished  Psychiatric Exam: alert and oriented, appropriate mood and behavior  Eye Exam: EOMI  Pulmonary Exam: breathing non-labored, no apparent distress  Lymphatic  exam: no appreciable lymphadenopathy in the lower extremities  Cardiovascular exam: RRR to peripheral palpation, DP pulses 2+, PT 2+, toes are pink with good capillary refill, no pitting edema  Skin exam: no open lesions, rashes, abrasions or ulcerations  Neurological exam: sensation to light touch intact in both lower extremities in peripheral and dermatomal distributions (except for any abnormalities noted in musculoskeletal exam)    Musculoskeletal exam: Left lower extremity examination.  Patient with diffuse dorsal foot edema without obvious skin wrinkling.  He has significant plantar ecchymosis.  Patient does have sensation grossly intact to light touch about the foot.  Midfoot ROM limited secondary to pain.  Patient has physiologic hindfoot valgus with neutral arch posture and no significant forefoot deformity noted.  Patient has intact PF/DF/EHL, pain limited. Patient has 2+ DP/PT pulse palpated.     Last Recorded Vitals  There were no vitals taken for this visit.    Laboratory Results  No results found for this or any previous visit (from the past 24 hours).     Radiology Results  X-ray and CT imaging reviewed from 4/18/2025 and independently evaluated by me on 4/24/2025 demonstrates second metatarsal base fracture with presumed Lisfranc avulsion type injury and widening of the C1-M2 interval, nondisplaced fracture of third metatarsal base as well as fourth metatarsal fracture concerning for Lisfranc variant injury.    Assessment/Plan:  59-year-old male 1 pack/day smoker with history of diabetes and concern for neuropathy who in my impression has left foot pain secondary to Lisfranc variant injury.  I have reviewed the diagnosis and treatment options extensively with the patient.  I would recommend the patient maintain strict nonweightbearing in his left lower extremity.  I discussed that this is a devastating injury to the eye that is unlikely that his foot or function the same as prior to injury or as  compared to the contralateral side.  I would recommend strict elevation to allow for soft tissue rest prior to consideration for definitive surgery requiring a left foot stress examination under anesthesia with ORIF/arthrodesis as indicated.I discussed that this is a complicated clinical situation given both his history of diabetes as well as smoking.  I discussed that I do not routinely perform elective foot and ankle surgery for patients that are actively smoking due to the concern for infection, wound healing complications as well the potential for failure of bone healing.  I will provide him with a referral to our tobacco cessation counselors.  Without surgery, the patient will remain at risk of Charcot neuroarthropathy as well as arch collapse and development of posttraumatic osteoarthritis.  At this point, we will continue to follow his clinical examination and may consider a delayed primary arthrodesis.  I reviewed that I would not be able to make them a new or normal extremity. Risks included but are not limited to infection, wound healing complications, need for further surgery, persistent or worsening pain, postoperative stiffness, bleeding, blood loss requiring a blood transfusion, neurovascular injury, mal- or non-union, arthrodesis failure, recurrent deformity, hardware failure, development of Charcot arthropathy, arch collapse or more progressive OA, hardware pain, failure of the procedure, complications of anesthesia, stroke, DVT/PE, myocardial infarction and death. Benefits included decreased pain, improve function and maintained alignment. Alternatives included conservative management which I would not recommend in this clinic setting. The patient voiced understanding of the risks, benefits and alternatives.  I will plan to see the patient next week for a skin check.  Upon return, patient would not require further imaging.    Regan Lindquist MD, HAZEL  Department of Orthopaedic  Surgery  [facility]    The diagnosis and treatment plan were reviewed with the patient. All questions were answered. The patient verbalized understanding of the treatment plan. There were no barriers to understanding identified.    Note dictated with Red Seraphim software.  Completed without full type editing and sent to avoid delay.         [1]   Past Medical History:  Diagnosis Date    Diabetes mellitus (Multi)     Hypertension    [2]   Social History  Socioeconomic History    Marital status:    Tobacco Use    Smoking status: Every Day     Current packs/day: 2.00     Types: Cigarettes    Smokeless tobacco: Never   Vaping Use    Vaping status: Never Used   Substance and Sexual Activity    Alcohol use: Defer    Drug use: Never    Sexual activity: Defer   [3] No family history on file.  [4] No Known Allergies

## 2025-04-25 LAB
EST. AVERAGE GLUCOSE BLD GHB EST-MCNC: 157 MG/DL
EST. AVERAGE GLUCOSE BLD GHB EST-SCNC: 8.7 MMOL/L
HBA1C MFR BLD: 7.1 %

## 2025-04-29 ENCOUNTER — OFFICE VISIT (OUTPATIENT)
Dept: ORTHOPEDIC SURGERY | Facility: CLINIC | Age: 60
End: 2025-04-29
Payer: COMMERCIAL

## 2025-04-29 DIAGNOSIS — S93.325A LISFRANC DISLOCATION, LEFT, INITIAL ENCOUNTER: Primary | ICD-10-CM

## 2025-04-29 DIAGNOSIS — Z72.0 TOBACCO USE: ICD-10-CM

## 2025-04-29 DIAGNOSIS — E11.9 DIABETES MELLITUS WITHOUT COMPLICATION: ICD-10-CM

## 2025-04-29 PROCEDURE — 99213 OFFICE O/P EST LOW 20 MIN: CPT | Performed by: STUDENT IN AN ORGANIZED HEALTH CARE EDUCATION/TRAINING PROGRAM

## 2025-04-29 ASSESSMENT — PAIN SCALES - GENERAL: PAINLEVEL_OUTOF10: 6

## 2025-04-29 ASSESSMENT — PAIN - FUNCTIONAL ASSESSMENT: PAIN_FUNCTIONAL_ASSESSMENT: 0-10

## 2025-04-29 NOTE — PROGRESS NOTES
ORTHOPAEDIC SURGERY OUTPATIENT PROGRESS NOTE    Chief Complaint:  Left foot pain    History Of Present Illness  Ankur Sanchez is a 59 y.o. male who presents for evaluation of left foot as a referral from Dr. Larsen.  Patient sustained an injury from 04/18/2025, he tripped after getting out of his bed with immediate pain and difficulty bearing weight in the left foot.  Patient carries a diagnosis of neuropathy with diabetes.  He was seen in an ER setting where imaging was obtained including x-ray and CT that was concerning for a Lisfranc variant injury.  He later followed up with an orthopedic provider and was referred for evaluation.  He has been nonweightbearing in his left lower extremity utilizing knee scooter for assisted ambulation.  He has been taking narcotics for pain control.    Patient has DM2 patient believes his last HbA1c was approximately 6% in spring she had to, 1 pack/day smoker, no current anticoagulation.    04/29/2025: Patient returns for follow-up of his left foot injury as above.  Patient has been elevating his extremity and has cut back on smoking although has not completely stopped at this point.  He is awaiting nicotine patches to be sent by another provider.  He is reporting 6 out of 10 pain.  He has researched his injury extensively.    Past Medical History  Medical History[1]    Surgical History  Recent Surgeries in Orthopaedic Surgery            No cases to display             Social History  Social History[2]    Family History  Family History[3]     Allergies  RX Allergies[4]    Review of Systems  REVIEW OF SYSTEMS  Constitutional: no unplanned weight loss  Psychiatric: no suicidal ideation  ENT: no vision changes, no sinus problems  Pulmonary: no shortness of breath  Lymphatic: no enlarged lymph nodes  Cardiovascular: no chest pain or shortness of breath  Gastrointestinal: no stomach problems  Genitourinary: no dysuria   Skin: no rashes  Endocrine: no thyroid problems  Neurological:  no headache, no numbness  Hematological: no easy bruising  Musculoskeletal: Left foot pain     Physical Exam  PHYSICAL EXAMINATION  Constitutional Exam: well developed and well nourished  Psychiatric Exam: alert and oriented, appropriate mood and behavior  Eye Exam: EOMI  Pulmonary Exam: breathing non-labored, no apparent distress  Lymphatic exam: no appreciable lymphadenopathy in the lower extremities  Cardiovascular exam: RRR to peripheral palpation, DP pulses 2+, PT 2+, toes are pink with good capillary refill, no pitting edema  Skin exam: no open lesions, rashes, abrasions or ulcerations  Neurological exam: sensation to light touch intact in both lower extremities in peripheral and dermatomal distributions (except for any abnormalities noted in musculoskeletal exam)    Musculoskeletal exam: Left lower extremity examination.  Patient with notable improvement in dorsal foot edema.  There is early skin wrinkling evident.  He continues with plantar ecchymosis that appears to be resolving. Patient does have sensation grossly intact to light touch about the foot.  Midfoot ROM limited secondary to pain.  Patient has physiologic hindfoot valgus with neutral arch posture and no significant forefoot deformity noted.  Patient has intact PF/DF/EHL, pain limited. Patient has 2+ DP/PT pulse palpated.     Last Recorded Vitals  There were no vitals taken for this visit.    Laboratory Results  No results found for this or any previous visit (from the past 24 hours).     Radiology Results  Previously reviewed imaging:  X-ray and CT imaging reviewed from 4/18/2025 and independently evaluated by me on 4/24/2025 demonstrates second metatarsal base fracture with presumed Lisfranc avulsion type injury and widening of the C1-M2 interval, nondisplaced fracture of third metatarsal base as well as fourth metatarsal fracture concerning for Lisfranc variant injury.    Assessment/Plan:  59-year-old male 1 pack/day smoker with history of  diabetes and concern for neuropathy who in my impression has left foot pain secondary to Lisfranc variant injury.  I have reviewed the diagnosis and treatment options extensively with the patient.  I would recommend proceeding with surgical plan as previously discussed including left foot stress examination under anesthesia with anticipated ORIF/arthrodesis as indicated, possible SUNIL.  I again discussed at length that this is a complex clinical situation given his diabetes with concern for neuropathy and ongoing tobacco use.  I again discussed that without stabilization, patient would risk secondary displacement/arch collapse and the possibility of Charcot degeneration.  The patient understands the increased risk of wound healing complications as well as the potential for infection and need for plastic surgery coverage.  We will work on scheduling surgery at this point I would plan to see the patient back approximately 2 to 3 weeks following surgery for wound check out of plaster.    Regan Lindquist MD, HAZEL  Department of Orthopaedic Surgery  [facility]    The diagnosis and treatment plan were reviewed with the patient. All questions were answered. The patient verbalized understanding of the treatment plan. There were no barriers to understanding identified.    Note dictated with Qub[employer] software.  Completed without full type editing and sent to avoid delay.         [1]   Past Medical History:  Diagnosis Date    Diabetes mellitus (Multi)     Hypertension    [2]   Social History  Socioeconomic History    Marital status:    Tobacco Use    Smoking status: Every Day     Current packs/day: 2.00     Types: Cigarettes    Smokeless tobacco: Never   Vaping Use    Vaping status: Never Used   Substance and Sexual Activity    Alcohol use: Defer    Drug use: Never    Sexual activity: Defer   [3] No family history on file.  [4] No Known Allergies

## 2025-04-30 DIAGNOSIS — Z01.818 PRE-OP TESTING: ICD-10-CM

## 2025-04-30 DIAGNOSIS — S93.325A LISFRANC DISLOCATION, LEFT, INITIAL ENCOUNTER: ICD-10-CM

## 2025-05-06 ENCOUNTER — CLINICAL SUPPORT (OUTPATIENT)
Dept: PREADMISSION TESTING | Facility: HOSPITAL | Age: 60
End: 2025-05-06
Payer: COMMERCIAL

## 2025-05-06 DIAGNOSIS — S93.325A LISFRANC DISLOCATION, LEFT, INITIAL ENCOUNTER: ICD-10-CM

## 2025-05-06 RX ORDER — ATORVASTATIN CALCIUM 20 MG/1
20 TABLET, FILM COATED ORAL DAILY
COMMUNITY

## 2025-05-06 RX ORDER — AMLODIPINE BESYLATE 10 MG/1
TABLET ORAL DAILY
COMMUNITY

## 2025-05-06 RX ORDER — DEXTROMETHORPHAN HYDROBROMIDE, GUAIFENESIN 5; 100 MG/5ML; MG/5ML
650 LIQUID ORAL EVERY 8 HOURS PRN
COMMUNITY

## 2025-05-06 RX ORDER — NICOTINE 7MG/24HR
1 PATCH, TRANSDERMAL 24 HOURS TRANSDERMAL EVERY 24 HOURS
COMMUNITY

## 2025-05-06 RX ORDER — CHLORTHALIDONE 50 MG/1
TABLET ORAL DAILY
COMMUNITY

## 2025-05-06 RX ORDER — IBUPROFEN 400 MG/1
400 TABLET, FILM COATED ORAL EVERY 6 HOURS PRN
COMMUNITY

## 2025-05-06 RX ORDER — LISINOPRIL 40 MG/1
40 TABLET ORAL DAILY
COMMUNITY

## 2025-05-06 NOTE — CPM/PAT NURSE NOTE
CPM/PAT Nurse Note      Name: Ankur Sanchez (Ankur Sanchez)  /Age: 1965/59 y.o.       Medical History[1]    Surgical History[2]    Patient Sexual activity questions deferred to the physician.    Family History[3]    Allergies[4]    Prior to Admission medications    Medication Sig Start Date End Date Taking? Authorizing Provider   acetaminophen (Tylenol 8 HOUR) 650 mg ER tablet Take 1 tablet (650 mg) by mouth every 8 hours if needed for mild pain (1 - 3). Do not crush, chew, or split.    Historical Provider, MD   albuterol 90 mcg/actuation inhaler Inhale 1-2 puffs every 6 hours if needed for wheezing or shortness of breath.  Patient not taking: Reported on 2025  ISELA Castro-CNP   amLODIPine (Norvasc) 10 mg tablet Take by mouth once daily.    Historical Provider, MD   atorvastatin (Lipitor) 20 mg tablet Take 1 tablet (20 mg) by mouth once daily.    Historical Provider, MD   chlorthalidone (Hygroton) 50 mg tablet Take by mouth once daily.    Historical Provider, MD   empagliflozin (Jardiance) 10 mg tablet Take by mouth once daily.    Historical Provider, MD   ibuprofen 400 mg tablet Take 1 tablet (400 mg) by mouth every 6 hours if needed for moderate pain (4 - 6).    Historical Provider, MD   lisinopril 40 mg tablet Take 1 tablet (40 mg) by mouth once daily.    Historical Provider, MD   nicotine (Nicoderm CQ) 7 mg/24 hr patch Place 1 patch on the skin once every 24 hours.    Historical Provider, MD   trolamine salicylate (Aspercreme) 10 % cream Apply topically if needed for muscle/joint pain.    Historical Provider, MD   methylPREDNISolone (Medrol Dospak) 4 mg tablets Follow schedule on package instructions 24  MELINDA Castro        PAT ROS     DASI Risk Score    No data to display       Caprini DVT Assessment    No data to display       Modified Frailty Index    No data to display       [id number] Stroke Risk Points  Current as of just now         N/A 0 to 9 Points:      Last Change: N/A          The [id number] risk score (Aggie TOURE, et al. 2009. © 2010 American College [employer] Chest [employer]) quantifies the risk of stroke for a patient with atrial fibrillation. For patients without atrial fibrillation or under the age of 18 this score appears as N/A. Higher score values generally indicate higher risk of stroke.        This score is not applicable to this patient. Components are not calculated.          Revised Cardiac Risk Index    No data to display       Apfel Simplified Score    No data to display       Risk Analysis Index Results This Encounter    No data found in the last 10 encounters.       Prodigy: High Risk  Total Score: 8              Prodigy Gender Score          ARISCAT Score for Postoperative Pulmonary Complications    No data to display       West Perioperative Risk for Myocardial Infarction or Cardiac Arrest (HERMES)    No data to display         Nurse Plan of Action:     RN screening call complete.  Reviewed allergies, medications and pharmacy, medical, surgical and social history with patient.  Chart updated.  Instructed patient to stop jardiance 3 days before surgery and  IBU 1 week prior to surgery.             [1]   Past Medical History:  Diagnosis Date    Diabetes mellitus (Multi)     a1C= 7.1% on 4/24/25    ED (erectile dysfunction)     HLD (hyperlipidemia)     Hypertension     Lisfranc dislocation, left, initial encounter     left foot    Neuropathy     N/T left foot   [2]   Past Surgical History:  Procedure Laterality Date    TOTAL SHOULDER ARTHROPLASTY Left    [3]   Family History  Problem Relation Name Age of Onset    Diabetes Mother      Hypertension Mother      Cancer Mother      No Known Problems Father      Cancer Sister      Cancer Sister      Other (respiratory failure) Sister      Heart disease Sister     [4] No Known Allergies

## 2025-05-08 ENCOUNTER — PRE-ADMISSION TESTING (OUTPATIENT)
Dept: PREADMISSION TESTING | Facility: HOSPITAL | Age: 60
End: 2025-05-08
Payer: COMMERCIAL

## 2025-05-08 ENCOUNTER — DOCUMENTATION (OUTPATIENT)
Dept: PREADMISSION TESTING | Facility: HOSPITAL | Age: 60
End: 2025-05-08

## 2025-05-08 ENCOUNTER — LAB (OUTPATIENT)
Dept: LAB | Facility: HOSPITAL | Age: 60
End: 2025-05-08
Payer: COMMERCIAL

## 2025-05-08 VITALS
RESPIRATION RATE: 18 BRPM | TEMPERATURE: 97.8 F | WEIGHT: 187.39 LBS | HEIGHT: 69 IN | OXYGEN SATURATION: 95 % | HEART RATE: 87 BPM | SYSTOLIC BLOOD PRESSURE: 135 MMHG | DIASTOLIC BLOOD PRESSURE: 78 MMHG | BODY MASS INDEX: 27.76 KG/M2

## 2025-05-08 DIAGNOSIS — Z01.818 PREOP TESTING: Primary | ICD-10-CM

## 2025-05-08 DIAGNOSIS — Z01.818 ENCOUNTER FOR OTHER PREPROCEDURAL EXAMINATION: Primary | ICD-10-CM

## 2025-05-08 LAB
ANION GAP SERPL CALC-SCNC: 18 MMOL/L (ref 10–20)
BASOPHILS # BLD AUTO: 0.04 X10*3/UL (ref 0–0.1)
BASOPHILS NFR BLD AUTO: 0.5 %
BUN SERPL-MCNC: 27 MG/DL (ref 6–23)
CALCIUM SERPL-MCNC: 10.9 MG/DL (ref 8.6–10.3)
CHLORIDE SERPL-SCNC: 100 MMOL/L (ref 98–107)
CO2 SERPL-SCNC: 26 MMOL/L (ref 21–32)
CREAT SERPL-MCNC: 0.86 MG/DL (ref 0.5–1.3)
EGFRCR SERPLBLD CKD-EPI 2021: >90 ML/MIN/1.73M*2
EOSINOPHIL # BLD AUTO: 0.08 X10*3/UL (ref 0–0.7)
EOSINOPHIL NFR BLD AUTO: 1.1 %
ERYTHROCYTE [DISTWIDTH] IN BLOOD BY AUTOMATED COUNT: 12.8 % (ref 11.5–14.5)
GLUCOSE SERPL-MCNC: 158 MG/DL (ref 74–99)
HCT VFR BLD AUTO: 48.5 % (ref 41–52)
HGB BLD-MCNC: 16.6 G/DL (ref 13.5–17.5)
IMM GRANULOCYTES # BLD AUTO: 0.04 X10*3/UL (ref 0–0.7)
IMM GRANULOCYTES NFR BLD AUTO: 0.5 % (ref 0–0.9)
LYMPHOCYTES # BLD AUTO: 1.36 X10*3/UL (ref 1.2–4.8)
LYMPHOCYTES NFR BLD AUTO: 18.4 %
MCH RBC QN AUTO: 32.8 PG (ref 26–34)
MCHC RBC AUTO-ENTMCNC: 34.2 G/DL (ref 32–36)
MCV RBC AUTO: 96 FL (ref 80–100)
MONOCYTES # BLD AUTO: 0.8 X10*3/UL (ref 0.1–1)
MONOCYTES NFR BLD AUTO: 10.8 %
NEUTROPHILS # BLD AUTO: 5.09 X10*3/UL (ref 1.2–7.7)
NEUTROPHILS NFR BLD AUTO: 68.7 %
NRBC BLD-RTO: 0 /100 WBCS (ref 0–0)
PLATELET # BLD AUTO: 218 X10*3/UL (ref 150–450)
POTASSIUM SERPL-SCNC: 5.1 MMOL/L (ref 3.5–5.3)
RBC # BLD AUTO: 5.06 X10*6/UL (ref 4.5–5.9)
SODIUM SERPL-SCNC: 139 MMOL/L (ref 136–145)
WBC # BLD AUTO: 7.4 X10*3/UL (ref 4.4–11.3)

## 2025-05-08 PROCEDURE — 99204 OFFICE O/P NEW MOD 45 MIN: CPT | Performed by: PHYSICIAN ASSISTANT

## 2025-05-08 PROCEDURE — 87081 CULTURE SCREEN ONLY: CPT | Mod: AHULAB | Performed by: PHYSICIAN ASSISTANT

## 2025-05-08 PROCEDURE — 85025 COMPLETE CBC W/AUTO DIFF WBC: CPT

## 2025-05-08 PROCEDURE — 80048 BASIC METABOLIC PNL TOTAL CA: CPT

## 2025-05-08 RX ORDER — CHLORHEXIDINE GLUCONATE ORAL RINSE 1.2 MG/ML
SOLUTION DENTAL
Qty: 475 ML | Refills: 0 | Status: SHIPPED | OUTPATIENT
Start: 2025-05-08

## 2025-05-08 ASSESSMENT — ENCOUNTER SYMPTOMS
LIGHT-HEADEDNESS: 0
ARTHRALGIAS: 1
FEVER: 0
NECK PAIN: 0
MYALGIAS: 0
BLOOD IN STOOL: 0
SINUS CONGESTION: 0
EXCESSIVE BLEEDING: 0
RHINORRHEA: 0
NECK STIFFNESS: 0
ABDOMINAL PAIN: 0
ABDOMINAL DISTENTION: 0
DIARRHEA: 0
CONFUSION: 0
VISUAL CHANGE: 0
DYSPNEA AT REST: 0
UNEXPECTED WEIGHT CHANGE: 0
PALPITATIONS: 0
WEAKNESS: 0
CHILLS: 0
NAUSEA: 0
SKIN CHANGES: 0
WHEEZING: 0
EYE PAIN: 0
NUMBNESS: 0
DYSPNEA WITH EXERTION: 0
SHORTNESS OF BREATH: 0
VOMITING: 0
WOUND: 0
TROUBLE SWALLOWING: 0
HEMOPTYSIS: 0
EYE DISCHARGE: 0
LIMITED RANGE OF MOTION: 0
DIFFICULTY URINATING: 0
BRUISES/BLEEDS EASILY: 0
DYSURIA: 0
DOUBLE VISION: 0
COUGH: 0
CONSTIPATION: 0

## 2025-05-08 NOTE — CPM/PAT NURSE NOTE
CPM/PAT Nurse Note      Name: Ankur Sanchez (Ankur Sanchez)  /Age: 1965/59 y.o.       Medical History[1]    Surgical History[2]    Patient Sexual activity questions deferred to the physician.    Family History[3]    Allergies[4]    Prior to Admission medications    Medication Sig Start Date End Date Taking? Authorizing Provider   acetaminophen (Tylenol 8 HOUR) 650 mg ER tablet Take 1 tablet (650 mg) by mouth every 8 hours if needed for mild pain (1 - 3). Do not crush, chew, or split.  Patient not taking: Reported on 2025    Historical Provider, MD   albuterol 90 mcg/actuation inhaler Inhale 1-2 puffs every 6 hours if needed for wheezing or shortness of breath.  Patient not taking: Reported on 2025  ISELA Castro-CNP   amLODIPine (Norvasc) 10 mg tablet Take by mouth once daily.    Historical Provider, MD   atorvastatin (Lipitor) 20 mg tablet Take 1 tablet (20 mg) by mouth once daily.    Historical Provider, MD   chlorhexidine (Peridex) 0.12 % solution Swish for 30 seconds and spit 15mL of solution the night before and morning of surgery 25   [place] Ta PA-C   chlorthalidone (Hygroton) 50 mg tablet Take by mouth once daily.    Historical Provider, MD   empagliflozin (Jardiance) 10 mg tablet Take by mouth once daily.    Historical Provider, MD   ibuprofen 400 mg tablet Take 1 tablet (400 mg) by mouth every 6 hours if needed for moderate pain (4 - 6).    Historical Provider, MD   lisinopril 40 mg tablet Take 1 tablet (40 mg) by mouth once daily.    Historical Provider, MD   nicotine (Nicoderm CQ) 7 mg/24 hr patch Place 1 patch on the skin once every 24 hours.    Historical Provider, MD   trolamine salicylate (Aspercreme) 10 % cream Apply topically if needed for muscle/joint pain.  Patient not taking: Reported on 2025    Historical Provider, MD   methylPREDNISolone (Medrol Dospak) 4 mg tablets Follow schedule on package instructions 24  Karen  ELDER Zaidi, APRN-CNP        PAT ROS     DASI Risk Score    No data to display       Caprini DVT Assessment    No data to display       Modified Frailty Index    No data to display       [id number] Stroke Risk Points  Current as of just now        N/A 0 to 9 Points:      Last Change: N/A          The [id number] risk score (Lip GH, et al. 2009. © 2010 American College of Chest [employer]icians) quantifies the risk of stroke for a patient with atrial fibrillation. For patients without atrial fibrillation or under the age of 18 this score appears as N/A. Higher score values generally indicate higher risk of stroke.        This score is not applicable to this patient. Components are not calculated.          Revised Cardiac Risk Index    No data to display       Apfel Simplified Score    No data to display       Risk Analysis Index Results This Encounter    No data found in the last 10 encounters.       Prodigy: High Risk  Total Score: 8              Prodigy Gender Score          ARISCAT Score for Postoperative Pulmonary Complications    No data to display       West Perioperative Risk for Myocardial Infarction or Cardiac Arrest (HERMES)    No data to display         Nurse Plan of Action: After Visit Summary (AVS) reviewed and patient verbalized good understanding of medications and NPO instructions.                  [1]   Past Medical History:  Diagnosis Date    Diabetes mellitus (Multi)     a1C= 7.1% on 4/24/25    ED (erectile dysfunction)     HLD (hyperlipidemia)     Hypertension     Lisfranc dislocation, left, initial encounter     left foot    Neuropathy     N/T left foot   [2]   Past Surgical History:  Procedure Laterality Date    TOTAL SHOULDER ARTHROPLASTY Left    [3]   Family History  Problem Relation Name Age of Onset    Diabetes Mother      Hypertension Mother      Cancer Mother      No Known Problems Father      Cancer Sister      Cancer Sister      Other (respiratory failure) Sister      Heart disease Sister      [4] No Known Allergies

## 2025-05-08 NOTE — H&P (VIEW-ONLY)
[facility] Evaluation       Name: Ankur Sanchez (Ankur Sanchez)  /Age: 1965/59 y.o.       Date of Consult: 25    Referring Provider: Dr. Lindquist    Surgery, Date, and Length: Left Foot Joint Fusion; Lisfranc Dislocation - Left  Achilles Tendon Lengthening - Left  Foot Bone Excision; Bone Graft Repair - Left  Percutaneous Pinning - Left , 25, 230MIN    Ankur Sanchez is a 59 y.o. year-old male who presents to the [facility] for perioperative risk assessment prior to surgery.    Patient presents with a primary diagnosis of lisfranc dislocation on left foot. Pt was stepping over his dog on 25 and tripped and experienced immediate pain and was unable to bear weight on left foot.  He has foot in boot today.  He ambulates with scooter. He wishes to proceed with surgery as planned.     This note was created in part upon personal review of patient's medical records.      Patient is scheduled to have Left Foot Joint Fusion; Lisfranc Dislocation - Left  Achilles Tendon Lengthening - Left  Foot Bone Excision; Bone Graft Repair - Left  Percutaneous Pinning - Left      Pt denies any past history of anesthetic complications such as PONV, awareness, prolonged sedation, dental damage, aspiration, cardiac arrest, difficult intubation, difficult I.V. access or unexpected hospital admissions.  NO malignant hyperthermia and or pseudocholinesterase deficiency.  No history of blood transfusions     The patient is not a Mandae[employer] and will accept blood and blood products if medically indicated.   Type and screen NOT sent.     Past Medical History:   Diagnosis Date    Diabetes mellitus (Multi)     a1C= 7.1% on 25    ED (erectile dysfunction)     HLD (hyperlipidemia)     Hypertension     Lisfranc dislocation, left, initial encounter     left foot    Neuropathy     N/T left foot       Past Surgical History:   Procedure Laterality Date    TOTAL SHOULDER ARTHROPLASTY Left        Patient Sexual activity  questions deferred to the physician.    Family History   Problem Relation Name Age of Onset    Diabetes Mother      Hypertension Mother      Cancer Mother      No Known Problems Father      Cancer Sister      Cancer Sister      Other (respiratory failure) Sister      Heart disease Sister       Social History     Socioeconomic History    Marital status:      Spouse name: Not on file    Number of children: Not on file    Years of education: Not on file    Highest education level: Not on file   Occupational History    Not on file   Tobacco Use    Smoking status: Every Day     Current packs/day: 2.00     Types: Cigarettes    Smokeless tobacco: Never    Tobacco comments:     Trying to quit, down to 1/2 PPD from 2 PPD, has smoked for over 30 years   Vaping Use    Vaping status: Never Used   Substance and Sexual Activity    Alcohol use: Yes     Alcohol/week: 12.0 standard drinks of alcohol     Types: 12 Standard drinks or equivalent per week     Comment: drinks alcohol 2-3 days a week and will have 3-4 drinks    Drug use: Never    Sexual activity: Defer   Other Topics Concern    Not on file   Social History Narrative    Not on file     Social Drivers of Health     Financial Resource Strain: Not on file   Food Insecurity: Not on file   Transportation Needs: Not on file   Physical Activity: Not on file   Stress: Not on file   Social Connections: Not on file   Intimate Partner Violence: Not on file   Housing Stability: Not on file        No Known Allergies    Current Outpatient Medications   Medication Instructions    acetaminophen (TYLENOL 8 HOUR) 650 mg, Every 8 hours PRN    albuterol 90 mcg/actuation inhaler 1-2 puffs, inhalation, Every 6 hours PRN    amLODIPine (Norvasc) 10 mg tablet Daily    atorvastatin (LIPITOR) 20 mg, Daily    chlorhexidine (Peridex) 0.12 % solution Swish for 30 seconds and spit 15mL of solution the night before and morning of surgery    chlorthalidone (Hygroton) 50 mg tablet Daily     empagliflozin (Jardiance) 10 mg tablet oral, Daily    ibuprofen 400 mg, oral, Every 6 hours PRN    lisinopril 40 mg, Daily    nicotine (Nicoderm CQ) 7 mg/24 hr patch 1 patch, transdermal, Every 24 hours    trolamine salicylate (Aspercreme) 10 % cream As needed           PAT ROS:   Constitutional:    no fever   no chills   no unexpected weight change  Neuro/Psych:    no numbness   no weakness   no light-headedness   no confusion  Eyes:    no discharge   no pain   no vision loss   no diplopia   no visual disturbance  Ears:    no ear pain   no hearing loss   no tinnitus  Nose:    no nasal discharge   no sinus congestion   no epistaxis  Mouth:    no dental issues   no mouth pain   no oral bleeding   no mouth lesions  Throat:    no throat pain   no dysphagia  Neck:    no neck pain   no neck stiffness  Cardio:    Functional 4 Mets. 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   no chest pain   no palpitations   no peripheral edema   no dyspnea   no TRAYLOR  Respiratory:    no cough   no wheezing   no hemoptysis   no shortness of breath  Endocrine:    no cold intolerance   no heat intolerance  GI:    no abdominal distention   no abdominal pain   no constipation   no diarrhea   no nausea   no vomiting   no blood in stool  :    no difficulty urinating   no dysuria   no oliguria  Musculoskeletal:    arthralgias (left foot)   no myalgias   no decreased ROM  Hematologic:    does not bruise/bleed easily   no excessive bleeding   no history of blood transfusion   no blood clots  Skin:   no skin changes   no sores/wound   no rash      Physical Exam  Constitutional:       General: He is not in acute distress.     Appearance: Normal appearance. He is not ill-appearing, toxic-appearing or diaphoretic.   HENT:      Head: Normocephalic and atraumatic.      Nose: Nose normal. No congestion or rhinorrhea.      Mouth/Throat:      Mouth: Mucous membranes are moist.  "     Pharynx: No posterior oropharyngeal erythema.   Eyes:      Extraocular Movements: Extraocular movements intact.      Conjunctiva/sclera: Conjunctivae normal.   Cardiovascular:      Rate and Rhythm: Normal rate and regular rhythm.      Pulses: Normal pulses.      Heart sounds: Normal heart sounds. No murmur heard.     No friction rub. No gallop.   Pulmonary:      Effort: Pulmonary effort is normal. No respiratory distress.      Breath sounds: Normal breath sounds. No stridor. No wheezing, rhonchi or rales.   Abdominal:      General: Bowel sounds are normal. There is no distension.      Palpations: Abdomen is soft. There is no mass.      Tenderness: There is no abdominal tenderness. There is no guarding or rebound.      Hernia: No hernia is present.   Musculoskeletal:         General: Tenderness (left foot in boot; able to move toes and good cap refill) present. No swelling, deformity or signs of injury. Normal range of motion.      Cervical back: Normal range of motion and neck supple. No rigidity or tenderness.   Skin:     General: Skin is warm and dry.      Coloration: Skin is not jaundiced or pale.      Findings: No bruising, erythema, lesion or rash.   Neurological:      General: No focal deficit present.      Mental Status: He is alert and oriented to person, place, and time.      Cranial Nerves: No cranial nerve deficit.      Sensory: No sensory deficit.      Motor: No weakness.      Coordination: Coordination normal.   Psychiatric:         Mood and Affect: Mood normal.         Behavior: Behavior normal.          PAT AIRWAY:   Airway:     Mallampati::  II   Upper full denture; no teeth or denture on bottom          Visit Vitals  /78   Pulse 87   Temp 36.6 °C (97.8 °F) (Temporal)   Resp 18   Ht 1.74 m (5' 8.5\")   Wt 85 kg (187 lb 6.3 oz)   SpO2 95%   BMI 28.08 kg/m²   Smoking Status Every Day   BSA 2.03 m²     LABS:  Lab Results   Component Value Date    WBC 7.4 05/08/2025    HGB 16.6 05/08/2025    " HCT 48.5 05/08/2025    MCV 96 05/08/2025     05/08/2025        Lab Results   Component Value Date    GLUCOSE 158 (H) 05/08/2025    CALCIUM 10.9 (H) 05/08/2025     05/08/2025    K 5.1 05/08/2025    CO2 26 05/08/2025     05/08/2025    BUN 27 (H) 05/08/2025    CREATININE 0.86 05/08/2025      Lab Results   Component Value Date    HGBA1C 7.1 (H) 04/24/2025      EKG 11/7/24  Normal sinus rhythm  Normal ECG  No previous ECGs available      Assessment and Plan:     59 y.o.  male  scheduled for Left Foot Joint Fusion; Lisfranc Dislocation - Left  Achilles Tendon Lengthening - Left  Foot Bone Excision; Bone Graft Repair - Left  Percutaneous Pinning - Left on 5/9/25 with Dr. Lindquist for  left foot pain.   Presents to CPM today for perioperative risk stratification and optimization       Cardiovascular:  Patient has no active cardiac symptoms.   Patient denies any chest pain, tightness, heaviness, pressure, radiating pain, palpitations, irregular heartbeats, lightheadedness, cough, congestion, shortness of breath, TRAYLOR, PND, near syncope, weight loss or gain.    METS: 4+  RCRI: 0 points, 3.9%  risk for postoperative MACE     HTN - cont amlodipine and chlorthalidone on dos ; lisinopril HOLD evening prior to surgery and morning of surgery     Encouraged lifestyle modifications, low-sodium diet, and increase activity as tolerated.  Monitor BP and follow up with managing physician for readings sustaining >140/90.    HLD - cont statin on dos    Pulmonary:  No pulmonary diagnosis, however patient is at increased risk of perioperative complications secondary to  duration of surgery > 2 hours  Stop Bang score is 2 placing patient at low risk for SHANITA  ARISCAT: 26-44 points, 13.3% risk of in-hospital postoperative pulmonary complication  PRODIGY: Low risk for opioid induced respiratory depression    **Pt provided with deep breathing exercises during PAT visit today**    Nicotine dependence - Patient has history of  nicotine dependency. Smoking cessation education was provided to the patient.Cessation encouraged. - Physiological and physical aspects of tobacco addiction as well as strategies for quitting were discussed. - Counseling was given focusing on the harmful effects of this addiction especially given the patient's medical condition(s) which will be worsened because of the chemicals in tobacco      Endocrine:  DMII - hold jardiance 3 days prior to surgery  A1c 4/24/25 = 7.1%    Hematology:  Patient instructed to ambulate as soon as possible postoperatively to decrease thromboembolic risk.   Initiate mechanical DVT prophylaxis as soon as possible and initiate chemical prophylaxis when deemed safe from a bleeding standpoint post surgery.     LABS: CBC, BMP, MRSA ordered.  Results reviewed and show elevated nonfasting blood glucose with recent A1c already obtained. No need for additional testing.      Followup: MRSA pending    Caprini: 4    Risk assessment complete.  Patient is scheduled for a low to intermediate surgical risk procedure.        Preoperative medication instructions were provided and reviewed with the patient.  Any additional testing or evaluation was explained to the patient.  Nothing by mouth instructions were discussed and patient's questions were answered prior to conclusion to this encounter.  Patient verbalized understanding of preoperative instructions given in preadmission testing; discharge instructions available in EMR.    This note was dictated by a speech recognition.  Minor errors may have been detected in a speech recognition.

## 2025-05-08 NOTE — CPM/PAT H&P
[facility] Evaluation       Name: Ankur Sanchez (Ankur Sanchez)  /Age: 1965/59 y.o.       Date of Consult: 25    Referring Provider: Dr. Lindquist    Surgery, Date, and Length: Left Foot Joint Fusion; Lisfranc Dislocation - Left  Achilles Tendon Lengthening - Left  Foot Bone Excision; Bone Graft Repair - Left  Percutaneous Pinning - Left , 25, 230MIN    Ankur Sanchez is a 59 y.o. year-old male who presents to the [facility] for perioperative risk assessment prior to surgery.    Patient presents with a primary diagnosis of lisfranc dislocation on left foot. Pt was stepping over his dog on 25 and tripped and experienced immediate pain and was unable to bear weight on left foot.  He has foot in boot today.  He ambulates with scooter. He wishes to proceed with surgery as planned.     This note was created in part upon personal review of patient's medical records.      Patient is scheduled to have Left Foot Joint Fusion; Lisfranc Dislocation - Left  Achilles Tendon Lengthening - Left  Foot Bone Excision; Bone Graft Repair - Left  Percutaneous Pinning - Left      Pt denies any past history of anesthetic complications such as PONV, awareness, prolonged sedation, dental damage, aspiration, cardiac arrest, difficult intubation, difficult I.V. access or unexpected hospital admissions.  NO malignant hyperthermia and or pseudocholinesterase deficiency.  No history of blood transfusions     The patient is not a Oriental orthodox and will accept blood and blood products if medically indicated.   Type and screen NOT sent.     Past Medical History:   Diagnosis Date    Diabetes mellitus (Multi)     a1C= 7.1% on 25    ED (erectile dysfunction)     HLD (hyperlipidemia)     Hypertension     Lisfranc dislocation, left, initial encounter     left foot    Neuropathy     N/T left foot       Past Surgical History:   Procedure Laterality Date    TOTAL SHOULDER ARTHROPLASTY Left        Patient Sexual activity  questions deferred to the physician.    Family History   Problem Relation Name Age of Onset    Diabetes Mother      Hypertension Mother      Cancer Mother      No Known Problems Father      Cancer Sister      Cancer Sister      Other (respiratory failure) Sister      Heart disease Sister       Social History     Socioeconomic History    Marital status:      Spouse name: Not on file    Number of children: Not on file    Years of education: Not on file    Highest education level: Not on file   Occupational History    Not on file   Tobacco Use    Smoking status: Every Day     Current packs/day: 2.00     Types: Cigarettes    Smokeless tobacco: Never    Tobacco comments:     Trying to quit, down to 1/2 PPD from 2 PPD, has smoked for over 30 years   Vaping Use    Vaping status: Never Used   Substance and Sexual Activity    Alcohol use: Yes     Alcohol/week: 12.0 standard drinks of alcohol     Types: 12 Standard drinks or equivalent per week     Comment: drinks alcohol 2-3 days a week and will have 3-4 drinks    Drug use: Never    Sexual activity: Defer   Other Topics Concern    Not on file   Social History Narrative    Not on file     Social Drivers of Health     Financial Resource Strain: Not on file   Food Insecurity: Not on file   Transportation Needs: Not on file   Physical Activity: Not on file   Stress: Not on file   Social Connections: Not on file   Intimate Partner Violence: Not on file   Housing Stability: Not on file        No Known Allergies    Current Outpatient Medications   Medication Instructions    acetaminophen (TYLENOL 8 HOUR) 650 mg, Every 8 hours PRN    albuterol 90 mcg/actuation inhaler 1-2 puffs, inhalation, Every 6 hours PRN    amLODIPine (Norvasc) 10 mg tablet Daily    atorvastatin (LIPITOR) 20 mg, Daily    chlorhexidine (Peridex) 0.12 % solution Swish for 30 seconds and spit 15mL of solution the night before and morning of surgery    chlorthalidone (Hygroton) 50 mg tablet Daily     empagliflozin (Jardiance) 10 mg tablet oral, Daily    ibuprofen 400 mg, oral, Every 6 hours PRN    lisinopril 40 mg, Daily    nicotine (Nicoderm CQ) 7 mg/24 hr patch 1 patch, transdermal, Every 24 hours    trolamine salicylate (Aspercreme) 10 % cream As needed           PAT ROS:   Constitutional:    no fever   no chills   no unexpected weight change  Neuro/Psych:    no numbness   no weakness   no light-headedness   no confusion  Eyes:    no discharge   no pain   no vision loss   no diplopia   no visual disturbance  Ears:    no ear pain   no hearing loss   no tinnitus  Nose:    no nasal discharge   no sinus congestion   no epistaxis  Mouth:    no dental issues   no mouth pain   no oral bleeding   no mouth lesions  Throat:    no throat pain   no dysphagia  Neck:    no neck pain   no neck stiffness  Cardio:    Functional 4 Mets. 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   no chest pain   no palpitations   no peripheral edema   no dyspnea   no TRAYLOR  Respiratory:    no cough   no wheezing   no hemoptysis   no shortness of breath  Endocrine:    no cold intolerance   no heat intolerance  GI:    no abdominal distention   no abdominal pain   no constipation   no diarrhea   no nausea   no vomiting   no blood in stool  :    no difficulty urinating   no dysuria   no oliguria  Musculoskeletal:    arthralgias (left foot)   no myalgias   no decreased ROM  Hematologic:    does not bruise/bleed easily   no excessive bleeding   no history of blood transfusion   no blood clots  Skin:   no skin changes   no sores/wound   no rash      Physical Exam  Constitutional:       General: He is not in acute distress.     Appearance: Normal appearance. He is not ill-appearing, toxic-appearing or diaphoretic.   HENT:      Head: Normocephalic and atraumatic.      Nose: Nose normal. No congestion or rhinorrhea.      Mouth/Throat:      Mouth: Mucous membranes are moist.  "     Pharynx: No posterior oropharyngeal erythema.   Eyes:      Extraocular Movements: Extraocular movements intact.      Conjunctiva/sclera: Conjunctivae normal.   Cardiovascular:      Rate and Rhythm: Normal rate and regular rhythm.      Pulses: Normal pulses.      Heart sounds: Normal heart sounds. No murmur heard.     No friction rub. No gallop.   Pulmonary:      Effort: Pulmonary effort is normal. No respiratory distress.      Breath sounds: Normal breath sounds. No stridor. No wheezing, rhonchi or rales.   Abdominal:      General: Bowel sounds are normal. There is no distension.      Palpations: Abdomen is soft. There is no mass.      Tenderness: There is no abdominal tenderness. There is no guarding or rebound.      Hernia: No hernia is present.   Musculoskeletal:         General: Tenderness (left foot in boot; able to move toes and good cap refill) present. No swelling, deformity or signs of injury. Normal range of motion.      Cervical back: Normal range of motion and neck supple. No rigidity or tenderness.   Skin:     General: Skin is warm and dry.      Coloration: Skin is not jaundiced or pale.      Findings: No bruising, erythema, lesion or rash.   Neurological:      General: No focal deficit present.      Mental Status: He is alert and oriented to person, place, and time.      Cranial Nerves: No cranial nerve deficit.      Sensory: No sensory deficit.      Motor: No weakness.      Coordination: Coordination normal.   Psychiatric:         Mood and Affect: Mood normal.         Behavior: Behavior normal.          PAT AIRWAY:   Airway:     Mallampati::  II   Upper full denture; no teeth or denture on bottom          Visit Vitals  /78   Pulse 87   Temp 36.6 °C (97.8 °F) (Temporal)   Resp 18   Ht 1.74 m (5' 8.5\")   Wt 85 kg (187 lb 6.3 oz)   SpO2 95%   BMI 28.08 kg/m²   Smoking Status Every Day   BSA 2.03 m²     LABS:  Lab Results   Component Value Date    WBC 7.4 05/08/2025    HGB 16.6 05/08/2025    " HCT 48.5 05/08/2025    MCV 96 05/08/2025     05/08/2025        Lab Results   Component Value Date    GLUCOSE 158 (H) 05/08/2025    CALCIUM 10.9 (H) 05/08/2025     05/08/2025    K 5.1 05/08/2025    CO2 26 05/08/2025     05/08/2025    BUN 27 (H) 05/08/2025    CREATININE 0.86 05/08/2025      Lab Results   Component Value Date    HGBA1C 7.1 (H) 04/24/2025      EKG 11/7/24  Normal sinus rhythm  Normal ECG  No previous ECGs available      Assessment and Plan:     59 y.o.  male  scheduled for Left Foot Joint Fusion; Lisfranc Dislocation - Left  Achilles Tendon Lengthening - Left  Foot Bone Excision; Bone Graft Repair - Left  Percutaneous Pinning - Left on 5/9/25 with Dr. Linqduist for  left foot pain.   Presents to CPM today for perioperative risk stratification and optimization       Cardiovascular:  Patient has no active cardiac symptoms.   Patient denies any chest pain, tightness, heaviness, pressure, radiating pain, palpitations, irregular heartbeats, lightheadedness, cough, congestion, shortness of breath, TRAYLOR, PND, near syncope, weight loss or gain.    METS: 4+  RCRI: 0 points, 3.9%  risk for postoperative MACE     HTN - cont amlodipine and chlorthalidone on dos ; lisinopril HOLD evening prior to surgery and morning of surgery     Encouraged lifestyle modifications, low-sodium diet, and increase activity as tolerated.  Monitor BP and follow up with managing physician for readings sustaining >140/90.    HLD - cont statin on dos    Pulmonary:  No pulmonary diagnosis, however patient is at increased risk of perioperative complications secondary to  duration of surgery > 2 hours  Stop Bang score is 2 placing patient at low risk for SHANITA  ARISCAT: 26-44 points, 13.3% risk of in-hospital postoperative pulmonary complication  PRODIGY: Low risk for opioid induced respiratory depression    **Pt provided with deep breathing exercises during PAT visit today**    Nicotine dependence - Patient has history of  nicotine dependency. Smoking cessation education was provided to the patient.Cessation encouraged. - Physiological and physical aspects of tobacco addiction as well as strategies for quitting were discussed. - Counseling was given focusing on the harmful effects of this addiction especially given the patient's medical condition(s) which will be worsened because of the chemicals in tobacco      Endocrine:  DMII - hold jardiance 3 days prior to surgery  A1c 4/24/25 = 7.1%    Hematology:  Patient instructed to ambulate as soon as possible postoperatively to decrease thromboembolic risk.   Initiate mechanical DVT prophylaxis as soon as possible and initiate chemical prophylaxis when deemed safe from a bleeding standpoint post surgery.     LABS: CBC, BMP, MRSA ordered.  Results reviewed and show elevated nonfasting blood glucose with recent A1c already obtained. No need for additional testing.      Followup: MRSA pending    Caprini: 4    Risk assessment complete.  Patient is scheduled for a low to intermediate surgical risk procedure.        Preoperative medication instructions were provided and reviewed with the patient.  Any additional testing or evaluation was explained to the patient.  Nothing by mouth instructions were discussed and patient's questions were answered prior to conclusion to this encounter.  Patient verbalized understanding of preoperative instructions given in preadmission testing; discharge instructions available in EMR.    This note was dictated by a speech recognition.  Minor errors may have been detected in a speech recognition.

## 2025-05-08 NOTE — CPM/PAT NURSE NOTE
CPM/PAT Nurse Note      Name: Ankur Sanchez (Ankur Sanchez)  /Age: 1965/59 y.o.       Medical History[1]    Surgical History[2]    Patient Sexual activity questions deferred to the physician.    Family History[3]    Allergies[4]    Prior to Admission medications    Medication Sig Start Date End Date Taking? Authorizing Provider   acetaminophen (Tylenol 8 HOUR) 650 mg ER tablet Take 1 tablet (650 mg) by mouth every 8 hours if needed for mild pain (1 - 3). Do not crush, chew, or split.  Patient not taking: Reported on 2025    Historical Provider, MD   albuterol 90 mcg/actuation inhaler Inhale 1-2 puffs every 6 hours if needed for wheezing or shortness of breath.  Patient not taking: Reported on 2025  ISELA Castro-CNP   amLODIPine (Norvasc) 10 mg tablet Take by mouth once daily.    Historical Provider, MD   atorvastatin (Lipitor) 20 mg tablet Take 1 tablet (20 mg) by mouth once daily.    Historical Provider, MD   chlorhexidine (Peridex) 0.12 % solution Swish for 30 seconds and spit 15mL of solution the night before and morning of surgery 25   Jamaica Ta PA-C   chlorthalidone (Hygroton) 50 mg tablet Take by mouth once daily.    Historical Provider, MD   empagliflozin (Jardiance) 10 mg tablet Take by mouth once daily.    Historical Provider, MD   ibuprofen 400 mg tablet Take 1 tablet (400 mg) by mouth every 6 hours if needed for moderate pain (4 - 6).    Historical Provider, MD   lisinopril 40 mg tablet Take 1 tablet (40 mg) by mouth once daily.    Historical Provider, MD   nicotine (Nicoderm CQ) 7 mg/24 hr patch Place 1 patch on the skin once every 24 hours.    Historical Provider, MD   trolamine salicylate (Aspercreme) 10 % cream Apply topically if needed for muscle/joint pain.  Patient not taking: Reported on 2025    Historical Provider, MD   methylPREDNISolone (Medrol Dospak) 4 mg tablets Follow schedule on package instructions 24  Karen  ELDER Zaidi, APRN-CNP        PAT ROS     DASI Risk Score    No data to display       Caprini DVT Assessment    No data to display       Modified Frailty Index    No data to display       [id number] Stroke Risk Points  Current as of just now        N/A 0 to 9 Points:      Last Change: N/A          The [id number] risk score (Lip GH, et al. 2009. © 2010 [employer]) quantifies the risk of stroke for a patient with atrial fibrillation. For patients without atrial fibrillation or under the age of 18 this score appears as N/A. Higher score values generally indicate higher risk of stroke.        This score is not applicable to this patient. Components are not calculated.          Revised Cardiac Risk Index    No data to display       Apfel Simplified Score    No data to display       Risk Analysis Index Results This Encounter    No data found in the last 10 encounters.       Prodigy: High Risk  Total Score: 8              Prodigy Gender Score          ARISCAT Score for Postoperative Pulmonary Complications    No data to display       West Perioperative Risk for Myocardial Infarction or Cardiac Arrest (HERMES)    No data to display         Nurse Plan of Action: After Visit Summary (AVS) reviewed and patient verbalized good understanding of medications and NPO instructions.  Pre-op infection prevention measures:  CHG showers and mouthwash reviewed, understanding voiced.  CHG soap given and patient verbalized need to pick CHG mouthwash at their preferred local pharmacy.                  [1]   Past Medical History:  Diagnosis Date    Diabetes mellitus (Multi)     a1C= 7.1% on 4/24/25    ED (erectile dysfunction)     HLD (hyperlipidemia)     Hypertension     Lisfranc dislocation, left, initial encounter     left foot    Neuropathy     N/T left foot   [2]   Past Surgical History:  Procedure Laterality Date    TOTAL SHOULDER ARTHROPLASTY Left    [3]   Family History  Problem Relation Name Age of Onset     Diabetes Mother      Hypertension Mother      Cancer Mother      No Known Problems Father      Cancer Sister      Cancer Sister      Other (respiratory failure) Sister      Heart disease Sister     [4] No Known Allergies

## 2025-05-08 NOTE — PREPROCEDURE INSTRUCTIONS
Medication List            Accurate as of May 8, 2025  7:28 AM. Always use your most recent med list.                acetaminophen 650 mg ER tablet  Commonly known as: Tylenol 8 HOUR  Medication Adjustments for Surgery: Take/Use as prescribed     albuterol 90 mcg/actuation inhaler  Inhale 1-2 puffs every 6 hours if needed for wheezing or shortness of breath.  Medication Adjustments for Surgery: Take/Use as prescribed     amLODIPine 10 mg tablet  Commonly known as: Norvasc  Medication Adjustments for Surgery: Take on the morning of surgery     atorvastatin 20 mg tablet  Commonly known as: Lipitor  Medication Adjustments for Surgery: Take/Use as prescribed     chlorhexidine 0.12 % solution  Commonly known as: Peridex  Swish for 30 seconds and spit 15mL of solution the night before and morning of surgery  Medication Adjustments for Surgery: Take/Use as prescribed     chlorthalidone 50 mg tablet  Commonly known as: Hygroton  Medication Adjustments for Surgery: Take on the morning of surgery     ibuprofen 400 mg tablet  Additional Medication Adjustments for Surgery: Take last dose 7 days before surgery  Notes to patient: Stop now     Jardiance 10 mg tablet  Generic drug: empagliflozin  Medication Adjustments for Surgery: Take last dose 3 days before surgery  Notes to patient: Last dose 5/5/25     lisinopril 40 mg tablet  Notes to patient: HOLD evening prior to surgery and morning of surgery        Nicoderm CQ 7 mg/24 hr patch  Generic drug: nicotine  Medication Adjustments for Surgery: Do Not take on the morning of surgery     trolamine salicylate 10 % cream  Commonly known as: Aspercreme  Medication Adjustments for Surgery: Do Not take on the morning of surgery                            **Concerning above medication instructions, if medication is normally taken at night, continue normal schedule.**  **DO NOT TAKE NIGHT PRIOR AND MORNING OF SURGERY**    CONTACT SURGEON'S OFFICE IF YOU DEVELOP:  * Fever = 100.4 F   *  New respiratory symptoms (e.g. cough, shortness of breath, respiratory distress, sore throat)  * Recent loss of taste or smell  *Flu like symptoms such as headache, fatigue or gastrointestinal symptoms  * You develop any open sores, shingles, burning or painful urination   AND/OR:  * You no longer wish to have the surgery.  * Any other personal circumstances change that may lead to the need to cancel or defer this surgery.  *You were admitted to any hospital within one week of your planned procedure.    SMOKING:  *Quitting smoking can make a huge difference to your health and recovery from surgery.    *If you need help with quitting, call 3-885-QUIT-NOW.    THE DAY OF SURGERY:  *Do not eat any food after midnight the night before surgery.   *You must drink 13.5 ounces of clear liquids (i.e. water, black coffee (no milk or cream), tea, apple juice or electrolyte drinks (gatorade)) 2 hours before your arrival time.  *You may chew gum until 2 hours before your surgery    SURGICAL TIME  *You will be contacted between 2 p.m. and 6 p.m. the business day before your surgery with your arrival time.  *If you haven't received a call by 6pm, call [phone].  *Scheduled surgery times may change and you will be notified if this occurs-check your personal voicemail for any updates.    ON THE MORNING OF SURGERY:  *Wear comfortable, loose fitting clothing.   *Do not use moisturizers, creams, lotions or perfume.  *All jewelry and valuables should be left at home.  *Prosthetic devices such as contact lenses, hearing aids, dentures, eyelash extensions, hairpins and body piercing must be removed before surgery.    BRING WITH YOU:  *Photo ID and insurance card  *Current list of medicines and allergies  *Pacemaker/Defibrillator/Heart stent cards  *CPAP machine and mask  *Slings/splints/crutches  *Copy of your complete Advanced Directive/DHPOA-if applicable  *Neurostimulator implant remote    PARKING AND ARRIVAL:  *Check in at the Main  Entrance desk and let them know you are here for surgery.  *You will be directed to the 2nd floor surgical waiting area.    AFTER OUTPATIENT SURGERY:  *A responsible adult MUST accompany you at the time of discharge and stay with you for 24 hours after your surgery.  *You may NOT drive yourself home after surgery.  *You may use a taxi or ride sharing service (Pickup Services, Uber) to return home ONLY if you are accompanied by a friend or family member.  *Instructions for resuming your medications will be provided by your surgeon.       Home Preoperative Antibacterial Shower     What is a home preoperative antibacterial shower?  This shower is a way of cleaning the skin with a germ killing soap before surgery.  The soap contains chlorhexidine, commonly known as CHG.  CHG is a soap for your skin with germ killing ability.  Let your doctor know if you are allergic to chlorhexidine.    Why do I need to take a preoperative antibacterial shower?  Skin is not sterile.  It is best to try to make your skin as free of germs as possible before surgery.  Proper cleansing with a germ killing soap before surgery can lower the number of germs on your skin.  This helps to reduce the risk of infection at the surgical site.  Following the instructions listed below will help you prepare your skin for surgery.      How do I use the CHG skin cleanser?  Steps:  Begin using your CHG soap five days before your scheduled surgery on ________________________.    Days 1-4 Shower before bed:  Wash your face and genitals with your normal soap and rinse.           2.    Apply the CHG soap to a clean wet washcloth.  Turn the water off or move away                From the water spray to avoid premature rinsing of the CHG soap as you are applying.     3.   Lather your entire body from the neck down.  Do not use on your face or genitals.  4. Pay special attention to the area(s) where your incision(s) will be located unless they are on your face.  Avoid  scrubbing your skin too hard.  The important point is to have the CHG soap sit on your skin for 3 minutes.    When the 3 minutes are up, turn on the water and rinse the CHG soap off your body completely.   Pat yourself dry with a clean, freshly-laundered towel.  Dress in clean, freshly laundered night clothes.    Be sure to change bed sheets and blankets at least on the first night of CHG body wash use. May change linens every night of the above protocol for maximum benefit.   Day 5:  Last shower is the morning of surgery: Follow above Instructions.    NOTE:        *Keep CHG soap out of eyes and ear canals   *DO NOT wash with regular soap on your body after you have used the CHG soap solution  *DO NOT apply powders, lotions, or perfume.  *Deodorant may be used days 1-4, BUT NOT the day of surgery    Who should I contact if I have any questions regarding the use of CHG soap?  Call the [facility], Preadmission Testing at [phone] if you have any questions.              Patient Information: Pre-Operative Infection Prevention Measures     Why did I have my nose, under my arms and groin swabbed?  The purpose of the swab is to identify Staphylococcus aureus inside your nose or on your skin.  The swab was sent to the laboratory for culture.  A positive swab/culture for Staphylococcus aureus is called colonization or carriage.      What is Staphylococcus aureus?  Staphylococcus aureus, also known as “staph”, is a germ found on the skin or in the nose of healthy people.  Sometimes Staphylococcus aureus can get into the body and cause an infection.  This can be minor (such as pimples, boils or other skin problems).  It might also be serious (such as blood infection, pneumonia or a surgical site infection).    What is Staphylococcus aureus colonization or carriage?  Colonization or carriage means that a person has the germ but is not sick from it.  These bacteria can be spread on the hands or  when breathing or sneezing.    How is Staphylococcus aureus spread?  It is most often spread by close contact with a person or item that carries it.    What happens if my culture is positive for Staphylococcus aureus?  Your doctor/medical team will use this information to guide any antibiotic treatment which may be necessary.  Regardless of the culture results, we will clean the inside of your nose with a betadine swab just before you have your surgery.      Will I get an infection if I have Staphylococcus aureus in my nose or on my skin?  Anyone can get an infection with Staphylococcus aureus.  However, the best way to reduce your risk of infection is to follow the instructions provided to you for the use of your CHG soap and dental rinse.        Who should I contact if I have any questions?  Call the [facility], Preadmission Testing at [phone] if you have any questions.          Patient Information: Oral/Dental Rinse  **This is a prescription; pick it up at your preferred local pharmacy **  What is oral/dental rinse?   It is a mouthwash. It is a way of cleaning the mouth with a germ killing solution before your surgery.  The solution contains chlorhexidine, commonly known as CHG.   It is used inside the mouth to kill a bacteria known as Staphylococcus aureus.  Let your doctor know if you are allergic to Chlorhexidine.    Why do I need to use CHG oral/dental rinse?  The CHG oral/dental rinse helps to kill a bacteria in your mouth known a Staphylococcus aureus.     This reduces the risk of infection at the surgical site.      Using your CHG oral/dental rinse  STEPS:  Use your CHG oral/dental rinse after you brush your teeth the night before (at bedtime) and the morning of your surgery.  Follow all directions on your prescription label.    Use the cap on the container to measure 15ml (fill cap to fill line)  Swish (gargle if you can) the mouthwash in your mouth for at least 30  seconds, (do not to swallow) spit out  After you use your CHG rinse, do not rinse your mouth with water, drink or eat.  Please refer to prescription label for the appropriate time to resume oral intake  Dental rinse comes in one size bottle: 473ml ~16oz.  You will have leftover    rinse, discard after this use.    What side effects might I have using the CHG oral/dental rinse?  CHG rinse will stick to plaque on the teeth.  Brush and floss just before use.  Teeth brushing will help avoid staining of plaque during use.    Who should I contact if I have questions about the CHG oral/dental rinse?  Please call [facility], Preadmission Testing at [phone] if you have any questions          Preoperative Deep Breathing Exercises  Why it is important to do deep breathing exercises before my surgery?  Deep breathing exercises strengthen your breathing muscles.  This helps you to recover after your surgery and decreases the chance of breathing complications.  How are the deep breathing exercises done?  Sit straight with your back supported.  Breathe in deeply and slowly through your nose. Your lower rib cage should expand and your abdomen may move forward.  Hold that breath for 3 to 5 seconds.  Breathe out through pursed lips, slowly and completely.  Rest and repeat 10 times every hour while awake.  Rest longer if you become dizzy or lightheaded.

## 2025-05-09 ENCOUNTER — HOSPITAL ENCOUNTER (OUTPATIENT)
Facility: HOSPITAL | Age: 60
Setting detail: OUTPATIENT SURGERY
Discharge: HOME | End: 2025-05-09
Attending: STUDENT IN AN ORGANIZED HEALTH CARE EDUCATION/TRAINING PROGRAM | Admitting: STUDENT IN AN ORGANIZED HEALTH CARE EDUCATION/TRAINING PROGRAM
Payer: COMMERCIAL

## 2025-05-09 ENCOUNTER — APPOINTMENT (OUTPATIENT)
Dept: RADIOLOGY | Facility: HOSPITAL | Age: 60
End: 2025-05-09
Payer: COMMERCIAL

## 2025-05-09 ENCOUNTER — ANESTHESIA (OUTPATIENT)
Dept: OPERATING ROOM | Facility: HOSPITAL | Age: 60
End: 2025-05-09
Payer: COMMERCIAL

## 2025-05-09 ENCOUNTER — ANESTHESIA EVENT (OUTPATIENT)
Dept: OPERATING ROOM | Facility: HOSPITAL | Age: 60
End: 2025-05-09
Payer: COMMERCIAL

## 2025-05-09 VITALS
TEMPERATURE: 97.5 F | BODY MASS INDEX: 27.76 KG/M2 | HEIGHT: 69 IN | WEIGHT: 187.39 LBS | RESPIRATION RATE: 16 BRPM | DIASTOLIC BLOOD PRESSURE: 76 MMHG | OXYGEN SATURATION: 93 % | HEART RATE: 78 BPM | SYSTOLIC BLOOD PRESSURE: 122 MMHG

## 2025-05-09 DIAGNOSIS — S92.345A CLOSED NONDISPLACED FRACTURE OF FOURTH METATARSAL BONE OF LEFT FOOT, INITIAL ENCOUNTER: ICD-10-CM

## 2025-05-09 DIAGNOSIS — S93.325A LISFRANC DISLOCATION, LEFT, INITIAL ENCOUNTER: Primary | ICD-10-CM

## 2025-05-09 LAB — GLUCOSE BLD MANUAL STRIP-MCNC: 160 MG/DL (ref 74–99)

## 2025-05-09 PROCEDURE — 3700000002 HC GENERAL ANESTHESIA TIME - EACH INCREMENTAL 1 MINUTE: Performed by: STUDENT IN AN ORGANIZED HEALTH CARE EDUCATION/TRAINING PROGRAM

## 2025-05-09 PROCEDURE — 7100000001 HC RECOVERY ROOM TIME - INITIAL BASE CHARGE: Performed by: STUDENT IN AN ORGANIZED HEALTH CARE EDUCATION/TRAINING PROGRAM

## 2025-05-09 PROCEDURE — 76000 FLUOROSCOPY <1 HR PHYS/QHP: CPT | Mod: LT

## 2025-05-09 PROCEDURE — 2500000004 HC RX 250 GENERAL PHARMACY W/ HCPCS (ALT 636 FOR OP/ED): Mod: JZ | Performed by: ANESTHESIOLOGIST ASSISTANT

## 2025-05-09 PROCEDURE — 2500000004 HC RX 250 GENERAL PHARMACY W/ HCPCS (ALT 636 FOR OP/ED): Performed by: STUDENT IN AN ORGANIZED HEALTH CARE EDUCATION/TRAINING PROGRAM

## 2025-05-09 PROCEDURE — 2500000005 HC RX 250 GENERAL PHARMACY W/O HCPCS: Performed by: ANESTHESIOLOGIST ASSISTANT

## 2025-05-09 PROCEDURE — 3700000001 HC GENERAL ANESTHESIA TIME - INITIAL BASE CHARGE: Performed by: STUDENT IN AN ORGANIZED HEALTH CARE EDUCATION/TRAINING PROGRAM

## 2025-05-09 PROCEDURE — 82947 ASSAY GLUCOSE BLOOD QUANT: CPT

## 2025-05-09 PROCEDURE — A4649 SURGICAL SUPPLIES: HCPCS | Performed by: STUDENT IN AN ORGANIZED HEALTH CARE EDUCATION/TRAINING PROGRAM

## 2025-05-09 PROCEDURE — 28730 FUSION OF FOOT BONES: CPT | Performed by: STUDENT IN AN ORGANIZED HEALTH CARE EDUCATION/TRAINING PROGRAM

## 2025-05-09 PROCEDURE — 7100000002 HC RECOVERY ROOM TIME - EACH INCREMENTAL 1 MINUTE: Performed by: STUDENT IN AN ORGANIZED HEALTH CARE EDUCATION/TRAINING PROGRAM

## 2025-05-09 PROCEDURE — 28470 CLTX METATARSAL FX WO MNP EA: CPT | Performed by: STUDENT IN AN ORGANIZED HEALTH CARE EDUCATION/TRAINING PROGRAM

## 2025-05-09 PROCEDURE — 7100000010 HC PHASE TWO TIME - EACH INCREMENTAL 1 MINUTE: Performed by: STUDENT IN AN ORGANIZED HEALTH CARE EDUCATION/TRAINING PROGRAM

## 2025-05-09 PROCEDURE — 2500000005 HC RX 250 GENERAL PHARMACY W/O HCPCS: Mod: JZ | Performed by: STUDENT IN AN ORGANIZED HEALTH CARE EDUCATION/TRAINING PROGRAM

## 2025-05-09 PROCEDURE — 3600000003 HC OR TIME - INITIAL BASE CHARGE - PROCEDURE LEVEL THREE: Performed by: STUDENT IN AN ORGANIZED HEALTH CARE EDUCATION/TRAINING PROGRAM

## 2025-05-09 PROCEDURE — 2720000007 HC OR 272 NO HCPCS: Performed by: STUDENT IN AN ORGANIZED HEALTH CARE EDUCATION/TRAINING PROGRAM

## 2025-05-09 PROCEDURE — 3600000008 HC OR TIME - EACH INCREMENTAL 1 MINUTE - PROCEDURE LEVEL THREE: Performed by: STUDENT IN AN ORGANIZED HEALTH CARE EDUCATION/TRAINING PROGRAM

## 2025-05-09 PROCEDURE — C1889 IMPLANT/INSERT DEVICE, NOC: HCPCS | Performed by: STUDENT IN AN ORGANIZED HEALTH CARE EDUCATION/TRAINING PROGRAM

## 2025-05-09 PROCEDURE — C1713 ANCHOR/SCREW BN/BN,TIS/BN: HCPCS | Performed by: STUDENT IN AN ORGANIZED HEALTH CARE EDUCATION/TRAINING PROGRAM

## 2025-05-09 PROCEDURE — 20900 REMOVAL OF BONE FOR GRAFT: CPT | Performed by: STUDENT IN AN ORGANIZED HEALTH CARE EDUCATION/TRAINING PROGRAM

## 2025-05-09 PROCEDURE — 2500000005 HC RX 250 GENERAL PHARMACY W/O HCPCS: Performed by: STUDENT IN AN ORGANIZED HEALTH CARE EDUCATION/TRAINING PROGRAM

## 2025-05-09 PROCEDURE — 7100000009 HC PHASE TWO TIME - INITIAL BASE CHARGE: Performed by: STUDENT IN AN ORGANIZED HEALTH CARE EDUCATION/TRAINING PROGRAM

## 2025-05-09 PROCEDURE — 28615 REPAIR FOOT DISLOCATION: CPT | Performed by: STUDENT IN AN ORGANIZED HEALTH CARE EDUCATION/TRAINING PROGRAM

## 2025-05-09 PROCEDURE — 2780000003 HC OR 278 NO HCPCS: Performed by: STUDENT IN AN ORGANIZED HEALTH CARE EDUCATION/TRAINING PROGRAM

## 2025-05-09 DEVICE — SCREW, LOCKING, T8 FULL THREAD, 2.7 X 18 MM: Type: IMPLANTABLE DEVICE | Site: FOOT | Status: FUNCTIONAL

## 2025-05-09 DEVICE — IMPLANTABLE DEVICE: Type: IMPLANTABLE DEVICE | Site: FOOT | Status: FUNCTIONAL

## 2025-05-09 DEVICE — SCREW, LOCKING, T8 FULL THREAD, 2.7MM X 24MM: Type: IMPLANTABLE DEVICE | Site: FOOT | Status: FUNCTIONAL

## 2025-05-09 DEVICE — T-PLATE, NARROW LOCKING, 2.4/ 2 X 5 HOLES, L 41MM: Type: IMPLANTABLE DEVICE | Site: FOOT | Status: FUNCTIONAL

## 2025-05-09 DEVICE — SCREW, LOCKING, T8 FULL THREAD, 2.7 X 14MM: Type: IMPLANTABLE DEVICE | Site: FOOT | Status: FUNCTIONAL

## 2025-05-09 DEVICE — IMPLANTABLE DEVICE: Type: IMPLANTABLE DEVICE | Site: FOOT | Status: NON-FUNCTIONAL

## 2025-05-09 DEVICE — SCREW, WASHER, TI ASNIS III, 4MM: Type: IMPLANTABLE DEVICE | Site: FOOT | Status: FUNCTIONAL

## 2025-05-09 RX ORDER — SODIUM CHLORIDE 0.9 G/100ML
INJECTION, SOLUTION IRRIGATION AS NEEDED
Status: DISCONTINUED | OUTPATIENT
Start: 2025-05-09 | End: 2025-05-09 | Stop reason: HOSPADM

## 2025-05-09 RX ORDER — DIPHENHYDRAMINE HYDROCHLORIDE 50 MG/ML
12.5 INJECTION, SOLUTION INTRAMUSCULAR; INTRAVENOUS ONCE AS NEEDED
Status: DISCONTINUED | OUTPATIENT
Start: 2025-05-09 | End: 2025-05-09 | Stop reason: HOSPADM

## 2025-05-09 RX ORDER — CEFAZOLIN 1 G/1
INJECTION, POWDER, FOR SOLUTION INTRAVENOUS AS NEEDED
Status: DISCONTINUED | OUTPATIENT
Start: 2025-05-09 | End: 2025-05-09

## 2025-05-09 RX ORDER — ASPIRIN 81 MG/1
81 TABLET ORAL
Qty: 84 TABLET | Refills: 0 | Status: SHIPPED | OUTPATIENT
Start: 2025-05-09 | End: 2025-06-20

## 2025-05-09 RX ORDER — ONDANSETRON HYDROCHLORIDE 2 MG/ML
INJECTION, SOLUTION INTRAVENOUS AS NEEDED
Status: DISCONTINUED | OUTPATIENT
Start: 2025-05-09 | End: 2025-05-09

## 2025-05-09 RX ORDER — METHOCARBAMOL 100 MG/ML
INJECTION, SOLUTION INTRAMUSCULAR; INTRAVENOUS AS NEEDED
Status: DISCONTINUED | OUTPATIENT
Start: 2025-05-09 | End: 2025-05-09

## 2025-05-09 RX ORDER — ROCURONIUM BROMIDE 10 MG/ML
INJECTION, SOLUTION INTRAVENOUS AS NEEDED
Status: DISCONTINUED | OUTPATIENT
Start: 2025-05-09 | End: 2025-05-09

## 2025-05-09 RX ORDER — PROPOFOL 10 MG/ML
INJECTION, EMULSION INTRAVENOUS AS NEEDED
Status: DISCONTINUED | OUTPATIENT
Start: 2025-05-09 | End: 2025-05-09

## 2025-05-09 RX ORDER — HYDROMORPHONE HYDROCHLORIDE 1 MG/ML
INJECTION, SOLUTION INTRAMUSCULAR; INTRAVENOUS; SUBCUTANEOUS AS NEEDED
Status: DISCONTINUED | OUTPATIENT
Start: 2025-05-09 | End: 2025-05-09

## 2025-05-09 RX ORDER — ONDANSETRON HYDROCHLORIDE 2 MG/ML
4 INJECTION, SOLUTION INTRAVENOUS ONCE AS NEEDED
Status: DISCONTINUED | OUTPATIENT
Start: 2025-05-09 | End: 2025-05-09 | Stop reason: HOSPADM

## 2025-05-09 RX ORDER — LIDOCAINE HCL/PF 100 MG/5ML
SYRINGE (ML) INTRAVENOUS AS NEEDED
Status: DISCONTINUED | OUTPATIENT
Start: 2025-05-09 | End: 2025-05-09

## 2025-05-09 RX ORDER — TRANEXAMIC ACID 100 MG/ML
INJECTION, SOLUTION INTRAVENOUS AS NEEDED
Status: DISCONTINUED | OUTPATIENT
Start: 2025-05-09 | End: 2025-05-09

## 2025-05-09 RX ORDER — ROPIVACAINE HYDROCHLORIDE 5 MG/ML
INJECTION, SOLUTION EPIDURAL; INFILTRATION; PERINEURAL
Status: COMPLETED | OUTPATIENT
Start: 2025-05-09 | End: 2025-05-09

## 2025-05-09 RX ORDER — MIDAZOLAM HYDROCHLORIDE 1 MG/ML
INJECTION, SOLUTION INTRAMUSCULAR; INTRAVENOUS
Status: COMPLETED | OUTPATIENT
Start: 2025-05-09 | End: 2025-05-09

## 2025-05-09 RX ORDER — SODIUM CHLORIDE, SODIUM LACTATE, POTASSIUM CHLORIDE, CALCIUM CHLORIDE 600; 310; 30; 20 MG/100ML; MG/100ML; MG/100ML; MG/100ML
100 INJECTION, SOLUTION INTRAVENOUS CONTINUOUS
Status: DISCONTINUED | OUTPATIENT
Start: 2025-05-09 | End: 2025-05-09 | Stop reason: HOSPADM

## 2025-05-09 RX ORDER — OXYCODONE HYDROCHLORIDE 5 MG/1
5-10 TABLET ORAL EVERY 4 HOURS PRN
Qty: 28 TABLET | Refills: 0 | Status: SHIPPED | OUTPATIENT
Start: 2025-05-09 | End: 2025-05-19 | Stop reason: SDUPTHER

## 2025-05-09 RX ORDER — LIDOCAINE HYDROCHLORIDE 10 MG/ML
0.1 INJECTION, SOLUTION EPIDURAL; INFILTRATION; INTRACAUDAL; PERINEURAL ONCE
Status: DISCONTINUED | OUTPATIENT
Start: 2025-05-09 | End: 2025-05-09 | Stop reason: HOSPADM

## 2025-05-09 RX ORDER — SODIUM CHLORIDE, SODIUM LACTATE, POTASSIUM CHLORIDE, CALCIUM CHLORIDE 600; 310; 30; 20 MG/100ML; MG/100ML; MG/100ML; MG/100ML
INJECTION, SOLUTION INTRAVENOUS CONTINUOUS PRN
Status: DISCONTINUED | OUTPATIENT
Start: 2025-05-09 | End: 2025-05-09

## 2025-05-09 RX ORDER — METOPROLOL TARTRATE 1 MG/ML
INJECTION, SOLUTION INTRAVENOUS AS NEEDED
Status: DISCONTINUED | OUTPATIENT
Start: 2025-05-09 | End: 2025-05-09

## 2025-05-09 RX ORDER — OXYCODONE HYDROCHLORIDE 5 MG/1
5 TABLET ORAL EVERY 4 HOURS PRN
Status: DISCONTINUED | OUTPATIENT
Start: 2025-05-09 | End: 2025-05-09 | Stop reason: HOSPADM

## 2025-05-09 RX ORDER — LABETALOL HYDROCHLORIDE 5 MG/ML
5 INJECTION, SOLUTION INTRAVENOUS ONCE AS NEEDED
Status: DISCONTINUED | OUTPATIENT
Start: 2025-05-09 | End: 2025-05-09 | Stop reason: HOSPADM

## 2025-05-09 RX ORDER — FENTANYL CITRATE 50 UG/ML
INJECTION, SOLUTION INTRAMUSCULAR; INTRAVENOUS
Status: COMPLETED | OUTPATIENT
Start: 2025-05-09 | End: 2025-05-09

## 2025-05-09 RX ADMIN — METHOCARBAMOL 1000 MG: 1000 INJECTION, SOLUTION INTRAMUSCULAR; INTRAVENOUS at 10:02

## 2025-05-09 RX ADMIN — PROPOFOL 200 MG: 10 INJECTION, EMULSION INTRAVENOUS at 07:40

## 2025-05-09 RX ADMIN — ROPIVACAINE HYDROCHLORIDE 30 ML: 5 INJECTION, SOLUTION EPIDURAL; INFILTRATION; PERINEURAL at 07:15

## 2025-05-09 RX ADMIN — HYDROMORPHONE HYDROCHLORIDE 0.5 MG: 1 INJECTION, SOLUTION INTRAMUSCULAR; INTRAVENOUS; SUBCUTANEOUS at 11:24

## 2025-05-09 RX ADMIN — CEFAZOLIN 2 G: 1 INJECTION, POWDER, FOR SOLUTION INTRAMUSCULAR; INTRAVENOUS at 07:40

## 2025-05-09 RX ADMIN — FENTANYL CITRATE 100 MCG: 50 INJECTION, SOLUTION INTRAMUSCULAR; INTRAVENOUS at 07:15

## 2025-05-09 RX ADMIN — HYDROMORPHONE HYDROCHLORIDE 0.5 MG: 1 INJECTION, SOLUTION INTRAMUSCULAR; INTRAVENOUS; SUBCUTANEOUS at 11:28

## 2025-05-09 RX ADMIN — Medication 8 L/MIN: at 11:39

## 2025-05-09 RX ADMIN — ONDANSETRON 4 MG: 2 INJECTION, SOLUTION INTRAMUSCULAR; INTRAVENOUS at 11:16

## 2025-05-09 RX ADMIN — CARBOXYMETHYLCELLULOSE SODIUM 2 DROP: 0.5 SOLUTION/ DROPS OPHTHALMIC at 07:40

## 2025-05-09 RX ADMIN — DEXAMETHASONE SODIUM PHOSPHATE 8 MG: 4 INJECTION, SOLUTION INTRAMUSCULAR; INTRAVENOUS at 07:45

## 2025-05-09 RX ADMIN — SODIUM CHLORIDE, POTASSIUM CHLORIDE, SODIUM LACTATE AND CALCIUM CHLORIDE: 600; 310; 30; 20 INJECTION, SOLUTION INTRAVENOUS at 07:35

## 2025-05-09 RX ADMIN — SUGAMMADEX 200 MG: 100 INJECTION, SOLUTION INTRAVENOUS at 11:29

## 2025-05-09 RX ADMIN — TRANEXAMIC ACID 1000 MG: 1 INJECTION, SOLUTION INTRAVENOUS at 07:45

## 2025-05-09 RX ADMIN — MIDAZOLAM HYDROCHLORIDE 2 MG: 1 INJECTION, SOLUTION INTRAMUSCULAR; INTRAVENOUS at 07:15

## 2025-05-09 RX ADMIN — LIDOCAINE HYDROCHLORIDE 100 MG: 20 INJECTION INTRAVENOUS at 07:40

## 2025-05-09 RX ADMIN — FENTANYL CITRATE 50 MCG: 50 INJECTION, SOLUTION INTRAMUSCULAR; INTRAVENOUS at 07:40

## 2025-05-09 RX ADMIN — FENTANYL CITRATE 50 MCG: 50 INJECTION, SOLUTION INTRAMUSCULAR; INTRAVENOUS at 09:47

## 2025-05-09 RX ADMIN — METOPROLOL TARTRATE 5 MG: 5 INJECTION INTRAVENOUS at 10:02

## 2025-05-09 RX ADMIN — ROCURONIUM BROMIDE 80 MG: 10 INJECTION INTRAVENOUS at 07:40

## 2025-05-09 RX ADMIN — ROCURONIUM BROMIDE 50 MG: 10 INJECTION INTRAVENOUS at 09:47

## 2025-05-09 RX ADMIN — ROCURONIUM BROMIDE 20 MG: 10 INJECTION INTRAVENOUS at 09:17

## 2025-05-09 ASSESSMENT — PAIN - FUNCTIONAL ASSESSMENT
PAIN_FUNCTIONAL_ASSESSMENT: 0-10

## 2025-05-09 ASSESSMENT — PAIN SCALES - GENERAL
PAINLEVEL_OUTOF10: 0 - NO PAIN

## 2025-05-09 NOTE — ANESTHESIA PROCEDURE NOTES
Airway  Date/Time: 5/9/2025 7:42 AM  Reason: elective    Airway not difficult    Staffing  Performed: DAVID   Authorized by: Freddy Pierce MD    Performed by: DAVID Watkins  Patient location during procedure: OR    Patient Condition  Indications for airway management: anesthesia  Patient position: sniffing  MILS maintained throughout  Sedation level: deep     Final Airway Details   Preoxygenated: yes  Final airway type: endotracheal airway  Successful airway: ETT  Cuffed: yes   Successful intubation technique: direct laryngoscopy  Blade: Emilee  Blade size: #4  ETT size (mm): 7.5  Cormack-Lehane Classification: grade I - full view of glottis  Placement verified by: chest auscultation and capnometry   Cuff volume (mL): 9  Measured from: lips  ETT to lips (cm): 22  Number of attempts at approach: 1

## 2025-05-09 NOTE — OP NOTE
Left Foot Joint Fusion; Lisfranc Dislocation (L), Foot Bone Excision; Bone Graft Repair (L) Operative Note     Date: 2025  OR Location: [facility] A OR    Name: Ankur Sanchez, : 1965, Age: 60 y.o., MRN: [id number], Sex: male    Diagnosis  Pre-op Diagnosis      * Lisfranc dislocation, left, initial encounter [S93.325A]     * Closed nondisplaced fracture of fourth metatarsal bone of left foot, initial encounter [S92.345A] Post-op Diagnosis     * Lisfranc dislocation, left, initial encounter [S93.325A]     * Closed nondisplaced fracture of fourth metatarsal bone of left foot, initial encounter [S92.345A]     Procedures  Left Foot Joint Fusion; Lisfranc Dislocation  17738 - [place] ARTHRD MIDTARSL/TARSOMETATARSAL MULT/TRANSVRS    Foot Bone Excision; Bone Graft Repair  26627 - [place] BONE GRAFT ANY DONOR AREA MINOR/SMALL    Left Foot Joint Fusion; Lisfranc Dislocation  58557 - [place] CLOSED TX METATARSAL FRACTURE W/O MANIPULATION    Left Foot Joint Fusion; Lisfranc Dislocation  82459 - [place] OPEN TREATMENT TARSOMETATARSAL JOINT DISLOCATION      Surgeons      * Regan Lindquist - Primary    Resident/Fellow/Other Assistant:  Surgeons and Role:  * No surgeons found with a matching role *    Staff:   Circulator: Kim Fernandezub Person: Henry  Scrub Person: Rita Ramirez Circulator: Karine  Relief Scrub: Zully  Relief Scrub: Myranda Ramirez Scrub: Dorys    Anesthesia Staff: Anesthesiologist: Freddy Pierce MD  C-AA: DAVID Watkins  Frontline Breaker: DAVID Montaño    Procedure Summary  Anesthesia: General  ASA: II  Estimated Blood Loss: 30 mL  Intra-op Medications:   Administrations occurring from 0705 to 1135 on 25:   Medication Name Total Dose   sodium chloride 0.9 % irrigation solution 1,000 mL   fentaNYL (Sublimaze) injection 50 mcg/mL 200 mcg   midazolam (Versed) injection 1 mg/mL 2 mg   ropivacaine (Naropin) injection 0.5 % 30 mL   ceFAZolin (Ancef) vial 1 g 2 g   dexAMETHasone (Decadron) 4 mg/mL IV  Syringe 2 mL 8 mg   HYDROmorphone (Dilaudid) injection 1 mg/mL 1 mg   LR infusion Cannot be calculated   lidocaine (cardiac) injection 2% prefilled syringe 100 mg   lubricating eye drops ophthalmic solution 2 drop   methocarbamol (Robaxin) injection 1,000 mg   metoprolol tartrate (Lopressor) injection 5 mg   ondansetron (Zofran) 2 mg/mL injection 4 mg   propofol (Diprivan) injection 10 mg/mL 200 mg   rocuronium (ZeMuron) 50 mg/5 mL injection 150 mg   sugammadex (Bridion) 200 mg/2 mL injection 200 mg   tranexamic acid (Cyklokapron) injection 1,000 mg              Anesthesia Record               Intraprocedure I/O Totals          Intake    Tranexamic Acid 0.00 mL    The total shown is the total volume documented since Anesthesia Start was filed.    LR infusion 500.00 mL    Total Intake 500 mL       Output    Est. Blood Loss 30 mL    Total Output 30 mL       Net    Net Volume 470 mL          Specimen: No specimens collected              Drains and/or Catheters: * None in log *    Tourniquet Times:     Total Tourniquet Time Documented:  Thigh (Left) - 123 minutes  Total: Thigh (Left) - 123 minutes      Implants:  Implants       Type Name Action Serial No.      Other ALLOFIBER DBF Implanted [id number]     Screw T-PLATE, NARROW LOCKING, 2.7/ 2 X 5 HOLES, L 47MM - SNA - [id number] Implanted NA     Screw WIRE, ARYAN, TROCAR POINT - SNA - [id number], Not Implanted NA     Screw SCREW, LOCKING, T8 FULL THREAD, 2.7 X 18 MM - SNA - [id number] Implanted NA     Screw SCREW, LOCKING, T8 FULL THREAD, 2.7MM X 24MM - SNA - [id number] Implanted NA     Screw SCREW, LOCKING, T8 FULL THREAD, 2.7 X 28MM - SNA - [id number] Implanted NA     Screw SCREW, LOCKING, T8 FULL THREAD, 2.7 X 7MM - SNA - [id number] Implanted NA     Screw SCREW, LOCKING, T8 FULL THREAD, 2.7 X 34MM - SNA - [id number] Implanted NA     Screw SCREW, LOCKING, T8 FULL THREAD, 2.7 X 32MM - SNA - [id number] Implanted NA     Screw SCREW, LOCKING, T8 FULL  THREAD, 2.7 X 26MM - SNA - [id number] Implanted NA     Screw SCREW, LOCKING, T8 FULL THREAD, 2.7 X 14MM - SNA - [id number] Implanted NA     Screw 4.0MM CORTEX SCREW Implanted NA     Screw SCREW, WASHER, TI ASNIS III, 4MM - SNA - [id number] Implanted NA     Screw T-PLATE, NARROW LOCKING, 2.4/ 2 X 5 HOLES, L 41MM - SNA - [id number] Implanted NA            Patient Name: Ankur Sanchez  MRN: [id number]  YOB: 1965  Date of Service: 05/09/25  Report Type: Operative    SURGEON:  Regan Lindquist MD    ANESTHESIA:  General    ESTIMATED BLOOD LOSS: 30 cc    COMPLICATIONS: None apparent    DISPOSITION: PACU/Home    BRIEF CLINICAL HISTORY: 59-year-old male who presented for evaluation of left foot pain after fall out of bed with left foot pain from 4/18/2025.  Patient carries a diagnosis of neuropathy and diabetes.  He is a smoker but has cut back.  Patient was seen in the ER where injury imaging demonstrated a Lisfranc variant injury he was later referred for evaluation by me.  Patient was using knee scooter for assisted ambulation and requiring narcotics for pain control.  On my initial evaluation the patient he had no skin wrinkling evident.  His neurosensory examination was consistent with plantar ecchymosis and sensation intact to deep palpation with intact pulses.  Review of radiographs including x-ray and CT demonstrated metatarsal base fracture as well as widening of the Lisfranc interval, nondisplaced fractures of the third metatarsal base as well as fourth metatarsal base concerning for Lisfranc variant injury.  I had a long discussion with the patient and his wife regarding treatment options.  I reviewed that this was an unstable injury for which I would recommend surgery.  I reviewed at length with the patient that I do not routinely perform elective foot and ankle surgery for patients that are actively smoking due to the increased risk of infection, wound healing complications as well as  diminished bone healing potential.  Given the patient's injury as well as diabetes and neuropathy I was concerned regarding the possibility of degeneration to a Charcot process.  I recommended a left foot stress examination under anesthesia with anticipated ORIF/realignment arthrodesis as indicated. I reviewed that I would not be able to make them a new or normal extremity. Risks included but are not limited to infection, wound healing complications, need for further surgery, persistent or worsening pain, postoperative stiffness, bleeding, blood loss requiring a blood transfusion, neurovascular injury, mal- or non-union, recurrent deformity, arthrodesis failure, hardware failure, hardware pain, failure of the procedure, complications of anesthesia, stroke, DVT/PE, myocardial infarction and death. Benefits included decreased pain, improve function as well as improved stability. Alternatives included conservative management or delayed primary arthrodesis. The patient voiced understanding of the risks, benefits and alternatives and the informed consent was signed, with both myself and the patient present.    OPERATIVE REPORT: On the day of surgery 05/09/2025, the patient was met in the preoperative holding area by members of the orthopedic, anesthesia and nursing teams.  I marked the patient's left lower extremity with indelible ink with the patient as my witness.  The informed consent was again reviewed.  All remaining questions were answered.  In the preoperative holding area, the anesthesia team performed a regional block. The patient had been previously medically optimized for surgery by [facility].    The patient was then brought back to the operating room on [facility].  I led a preoperative timeout, verifying the correct patient, procedure and laterality of procedure to be performed.  We confirmed the appropriate availability of all surgical equipment,  implants, utilization of fluoroscopy and confirmed the  administration of pre-surgical IV antibiotic prophylaxis within 1 hour of incision time, 2 g Ancef and weight-based TXA.  All present were in agreement.    Care was handed over to the anesthesia team who provided GETA.  The patient was then transferred onto the operating room table in the supine position.  All bony prominences were padded and an SCD was placed on the contra-lateral extremity. A nonsterile, well-padded thigh tourniquet was placed.  The patient's left lower extremity was then prepped and draped in usual sterile fashion with sterile prep with ChloraPrep.    I led a preprocedure pause verifying the correct patient, procedure and laterality of procedure to be performed.  All present were in agreement.  I began by performing a fluoroscopic stress examination of the midfoot under anesthesia.  I obtained a AP foot view and performed a valgus stress maneuver, there was obvious widening of the Lisfranc interval as well as valgus deviation of the 2nd and 1st tarsometatarsal joints with distraction through the third metatarsal fracture, indicative of injury.  There is no obvious widening of the C1-C2 interval nor translation of the lateral column.  I marked out a standard modified dorsal approach to the midfoot under fluoroscopic guidance.    In anticipation of realignment arthrodesis.  I then proceeded with iliac crest/BMAC aspirate and dowel grafting.  I marked out a 5 mm incision approximately 3 cm posterior to the palpable ASIS.  I incised through skin and subcutaneous tissue and inserted the Magellan Jamshidi needle at the midline between the inner and outer table of the pelvic brim.  The Jamshidi needle was impacted and 60 cc of bone marrow aspirate was obtained and transferred for concentration.  Multiple dowel cord grafts were obtained with the Jamshidi needle completing the BMAC and iliac crest harvest portion of the procedure.  The wound was irrigated copiously and closed with 3-0 nylon in a simple  interrupted configuration.  The BMAC/ICBG was then used to rehydrate allofiber.    The patient's extremity was then exsanguinated with use of an Esmarch and the tourniquet was inflated to 275 mmHg.  I incised the skin and subcutaneous tissue, meticulous hemostasis was achieved.  The SPN nerve was identified, mobilized, and protected throughout the duration of the procedure.  Beginning with the medial window, I deepened dissection to the level of the EHL, which was mobilized and protected throughout the duration of the procedure with blunt soft tissue retraction.  Dissection was deepened to the periosteal level at the first tarsometatarsal joint.  There was obvious hemorrhage and fibrous tissue evident consistent with diagnosis.  Full-thickness but appropriate subperiosteal flaps were developed and there was obvious degenerative or posttraumatic changes of the first tarsometatarsal joint.  Attention was then turned to the middle window, with great care to protect the neurovascular bundle, I began dissection at the second metatarsal diaphysis and extended proximally, protecting the EDB with blunt soft tissue retraction throughout the duration of the procedure.  Minimal but appropriate subperiosteal dissection was performed to the second tarsometatarsal joint.  There was obvious fibrous tissue consistent with prior injury as well as degenerative or posttraumatic changes of the second tarsometatarsal joint.  There was obvious bone loss at the plantar second metatarsal base consistent with fracture.  Finally, I developed the lateral window in the interval between the 2nd and 3rd EDB tendons.  Dissection was deepened to the level of the third metatarsal diaphysis and then extended proximally to the third tarsometatarsal joint.  Again, there was fibrous tissue evident consistent with known injury as well as posttraumatic/degenerative changes of the tarsometatarsal joint and significant bone loss at the fracture site of the  third metatarsal.    Proceeding from medial to lateral, the 1st, 2nd and 3rd tarsometatarsal joints were prepared for arthrodesis utilizing pin distractors to visualize the plantar surfaces of the joint.  The cartilage surfaces were initially denuded with the use of an osteotome followed by a curette and then irrigated copiously.  The joint surfaces of the metatarsal and cuneiforms were first fenestrated with a drill and then a bur was utilized under cold water irrigation, with particular attention paid to the plantar surfaces.  The mixture of BMAC/dowel ICBG graft and allofiber was then impacted into the arthrodesis surfaces at the 1st, 2nd and 3rd tarsometatarsal joints.  Beginning again from medial to lateral, the first tarsometatarsal joint was closed reduced under both direct visualization and fluoroscopic imaging.  An appropriately sized [place] 2.7 mm plate was precontoured to fit the patient's anatomy and generate compression and dorsal buttress mode.  The plate was provisionally fixated with K wires center through locking towers and then provisionally delivered to bone with a 2.7 mm fully threaded locking screw in the third most distal plate hole.  Attention was then turned to the proximal fixation block which was secured with 2 bicortical fully threaded 2.7 mm locking screws.  The distal fixation block was then completed with an additional 2, 2.7 mm fully threaded locking screws.  Anatomic reduction and compression of the first tarsometatarsal joint was confirmed under biplanar fluoroscopic imaging.    Attention was then turned to the second tarsometatarsal joint as well as the Lisfranc interval.  I selected an appropriately sized [place] 2.7 mm mini fragment plate which was again precontoured to fit the patient's anatomy and generate compression and dorsal buttress mode.  This was again provisionally fixated to bone in the proximal and distal fixation block with K wires center through locking towers.  In  order to provide additional stability as well as anatomically reduced the Lisfranc interval, I performed a open reduction of the Lisfranc interval with a pointed reduction forceps.  This was confirmed under both direct visualization as well as multiplanar fluoroscopic imaging.  I then delivered a guidewire for a 4.0 mm fully threaded cortical screw with a cannulated technique from the lateral cortex of the second metatarsal retrograde to the medial cuneiform, under fluoroscopic guidance.  The screws measured and then inserted with a washer with excellent compression noted at the Lisfranc interval and anatomic alignment.    Returning to the 2nd TMTJ dorsal plate.  The plate was provisionally fixated to bone with a bicortical 4 threaded 2.7 millimeter screw through the fourth most distal plate hole.  Fixation was completed from both the proximal and distal fixation blocks with additional 2.7 mm fully threaded locking screws secured bicortically under fluoroscopic guidance.  This completed second tarsometatarsal joint arthrodesis.    By this time, the tourniquet was let down and there was excellent reperfusion of the extremity noted.  Meticulous hemostasis was achieved.  There was return of palpable 2+ DP/PT pulses.    Proceeding to the third TMTJ, I again selected an appropriately sized 2.7 mm mini fragment plate which was again anatomically precontoured to fit the patient's native anatomy and generate compression and dorsal buttress mode.  The plate was provisionally fixated to bone with K wires center through locking towers and positioned somewhat laterally distally due to the bone loss from the fracture.  The plate was then secured with a 2.7 mm fully threaded locking screw placed to the third most distal plate hole.  Fixation was completed in the proximal and distal fixation blocks with additional 2.7 mm fully threaded locking screws completing third tarsometatarsal joint arthrodesis.  The remainder of the bone  graft was impacted into the arthrodesis sites.    Under anesthesia, the patient's ankle dorsiflexion was tested, he was able to range at least 10 degrees past neutral and I did not proceed with tendo Achilles lengthening.    Final fluoroscopic films including AP and lateral foot, as well as repeat stress examination views were obtained demonstrating anatomic alignment following Lisfranc realignment arthrodesis with dorsal 1/2/3 buttress plate fixation and retrograde Lisfranc screw.  There is appropriate screw length as well as trajectory and repeat stress examination did not demonstrate secondary displacement through the lateral column so I did not proceed with K wire fixation of the fourth metatarsal fracture, this would be treated in a closed fashion.    The wounds were copiously irrigated and closed in layers.  0 Vicryl was used for the deep layer with 3-0 Monocryl for the deep dermal layer and 3-0 nylon for skin.  The skin was cleansed and dried and dry sterile dressings were placed.  The sterile drapes were removed.  The patient was then placed into a well-padded short leg splint.      All surgical counts were noted to be correct. The patient was then awoken from anesthesia without complication and transferred from the operating room table onto the [facility] and then brought back to the PACU in stable condition.    Post-Operative Plan:   The patient will remain nonweightbearing in their left lower extremity short leg plaster splint.  They will begin aspirin for DVT prophylaxis.  I have encouraged early mobilization and extremity elevation as tolerated.  I will plan to see the patient back in approximately 2 to 3 weeks for their first postoperative visit.    Evidence of Infection: No   Complications:  None; patient tolerated the procedure well.    Disposition: PACU - hemodynamically stable.  Condition: stable     Attending Attestation: I was present and scrubbed for the entire procedure.    Regan FERNÁNDEZ  [place]me[place]  Phone Number: [phone]

## 2025-05-09 NOTE — ANESTHESIA PROCEDURE NOTES
Peripheral Block    Patient location during procedure: pre-op  Medication administered at: 5/9/2025 7:15 AM  End time: 5/9/2025 7:18 AM  Reason for block: at surgeon's request and post-op pain management  Staffing  Performed: attending   Authorized by: Freddy Pierce MD    Performed by: Freddy Pierce MD  Preanesthetic Checklist  Completed: patient identified, IV checked, site marked, risks and benefits discussed, surgical consent, monitors and equipment checked, pre-op evaluation and timeout performed   Timeout performed at:   Peripheral Block  Patient position: laying flat  Prep: ChloraPrep and site prepped and draped  Patient monitoring: continuous pulse ox, heart rate and cardiac monitor  Block type: popliteal  Laterality: left  Injection technique: single-shot  Guidance: ultrasound guided  Local infiltration: lidocaine  Infiltration strength: 1 %  Dose: 3 mL  Needle  Needle type: short-bevel   Needle gauge: 22 G  Needle length: 8 cm  Needle localization: ultrasound guidance  Test dose: negative  Assessment  Injection assessment: negative aspiration for heme, local visualized surrounding nerve on ultrasound, no paresthesia on injection and incremental injection  Heart rate change: no  Slow fractionated injection: yes  Medications Administered  midazolam (VERSED) IV - intravenous   2 mg - 5/9/2025 7:15:00 AM  fentaNYL (SUBLIMAZE) IV - intravenous   100 mcg - 5/9/2025 7:15:00 AM  ropivacaine (NAROPIN) 5 MG/ML Perineural - perineural injection   30 mL - 5/9/2025 7:15:00 AM

## 2025-05-09 NOTE — ANESTHESIA PREPROCEDURE EVALUATION
Patient: Ankur Sanchez    Procedure Information       Date/Time: 05/09/25 0705    Procedures:       Left Foot Joint Fusion; Lisfranc Dislocation (Left: Foot)      Achilles Tendon Lengthening (Left: Foot)      Foot Bone Excision; Bone Graft Repair (Left: Foot)      Percutaneous Pinning (Left: Foot)    Location: [facility] A OR 18 / Virtual [facility] A OR    Surgeons: Regan Lindquist MD          Smoking Status: Every Day   Smokeless Tobacco Status: Never   Comments: Trying to quit, down to 1/2 PPD from 2 PPD, has smoked for over 30 years   Alcohol use: Yes; 12.0 standard drinks of alcohol per week   Comments: drinks alcohol 2-3 days a week and will have 3-4 drinks   Drug use: Never     History Comments   Diabetes mellitus (Multi) a1C= 7.1% on 4/24/25   Hypertension    HLD (hyperlipidemia)    ED (erectile dysfunction)    Neuropathy N/T left foot   Lisfranc dislocation, left, initial encounter left foot       Relevant Problems   No relevant active problems       Clinical information reviewed:   Tobacco  Allergies  Meds   Med Hx  Surg Hx   Fam Hx  Soc Hx        NPO Detail:  NPO/Void Status  Carbohydrate Drink Given Prior to Surgery? : N  Date of Last Liquid: 05/09/25  Time of Last Liquid: 0445  Date of Last Solid: 05/08/25  Time of Last Solid: 1830  Last Intake Type: Clear fluids  Time of Last Void: 0531         Physical Exam    Airway  Mallampati: II  TM distance: >3 FB  Neck ROM: full     Cardiovascular   Rhythm: regular  Rate: normal     Dental    Pulmonary Breath sounds clear to auscultation     Abdominal            Anesthesia Plan    History of general anesthesia?: yes  History of complications of general anesthesia?: no    ASA 2     general     intravenous induction   Anesthetic plan and risks discussed with patient.    Plan discussed with CRNA.

## 2025-05-09 NOTE — ANESTHESIA PROCEDURE NOTES
Peripheral IV  Date/Time: 5/9/2025 8:00 AM      Placement  Needle size: 16 G  Laterality: left  Location: hand  Local anesthetic: none  Site prep: alcohol  Technique: anatomical landmarks  Attempts: 1

## 2025-05-09 NOTE — DISCHARGE INSTRUCTIONS
"POST-OPERATIVE DISCHARGE INSTRUCTIONS:    ACTIVITY:    Non-Weightbearing LLE in a Short Leg Plaster Splint     You are advised to go home directly from the hospital or surgical center. Restrict your activities.    Return to school/work will be determined at next clinic visit, unless previously discussed with the surgical team     BLOOD CLOT PREVENTION:    To prevent blood clot formation, you have been prescribed ASA to be taken as prescribed until your surgeon or his Physician's Assistant (PA) states you can stop taking it.    Moving around and walking (with crutches) helps decrease the risk of blood clots. You must get up and \"move around\" once every hour, unless your are sleeping.    While you are sleeping and when you are not being active, continue to keep your leg elevated as much as possible.    It is common to have swelling in your feet after surgery for even a year afterwards, so the more you keep the leg elevated after surgery, the less swelling you will have later on.     GENERAL INSTRUCTIONS:  1.  Keep your surgical site elevated above your heart for at least 7 days or longer to prevent swelling (a good way to remember this is \"toes above nose\"). This will improve your comfort and your overall recovery following surgery.  Avoid pressure under the heel and keep the heel clear to avoid ulceration     2. Be alert for signs of infection including redness, streaking, odor, fever or chills. Be alert for excessive pain or bleeding and notify your surgeon immediately. Also, notify your surgeon of nausea, vomiting, or chills, numbness or weakness, bleeding, redness, swelling, pain, or drainage from surgical incision(s), bowel or bladder dysfunction, severe pain not relieved by pain medications, temperature greater than 101.0 F (38.3 C) degrees.    3. If you smoke (or have smoked within the last year), we strongly recommend that you do not smoke. This may lead to increased risk of wound infection and will decrease " your chances of healing (bone, soft tissue, etc).     WOUND INSTRUCTIONS:    Leave your Short Leg Plaster Splint until your post operative visit.  Keep it clean and dry.     Always keep the incision clean and dry until the staples/sutures are removed. If there is no drainage from the incision you should keep it open to air. If there is drainage from the incision you must keep it covered at all times until the drainage stops.    You may shower carefully (avoid falling) but the incision must be covered with Tegaderm or a water proof dressing so the wound does not get wet; once the staples/sutures are removed, the Tegaderm is no longer necessary and the wound may be washed with soap and water.     If you do not have sutures that need to be removed  then the incision should remain clean and dry for a minimum of 14 days.    Do not soak in a bath tub, hot tub, pool, lake or other body of water until atleast 21 days after your surgery and your incision is completely dry and healed. Or until approved by Dr. Lindquist.    If you have removable sutures (or staples) they will be removed in approximately 14-21 days (unless otherwise instructed) from the day of your surgery.     If you have a fiberglass cast or splint in place, please consider the following instructions:  1)  Elevate the extremity as much as possible.  2)  Keep the cast or splint clean and dry.  3)  Please call us if the cast becomes wet or dirty.  4)  If you are experiencing worsening pain or worsening swelling, please call.  5)  Itching can be bothersome.  You can try:  - Scratching the opposite limb in the same spot.  - Running air through the cast or splint with a blow dryer on cold.  - Do NOT stick anything inside the cast as it may become lodged    DIET:    Return to your regular diet as tolerated. Begin with light or bland foods. Drink plenty of fluids.      MEDICATIONS:    Resume all previous home medications at the previous prescribed dose and frequency  "unless otherwise noted    PAIN CONTROL:     For pain control, you have been prescribed medications.    Narcotic medication in the form of oxycodone. Take as prescribed and wean as able. Do not take with medications that are sedating, especially benzodiazepines.  You may take Ibuprofen (800mg every 6hrs) and Tylenol (500mg every 6hrs) for the first three days only.   - Alternate/stagger these two medications so that you're not taking them both at the same time. Take as prescribed for the first three days then stop when prescriptions done. This will provide a strong anti-inflammatory effect for the first few days.  3. The anesthesia block will wear off eventually. Some patients it will last only a few hours after surgery and in others it may last a few days. As soon as you start feeling any sensation at all in your leg, start taking the medications, so that you're not caught playing \"catch up\".   4. Remember to keep elevated. Avoid using ice while your extremity nerve block is still working. If you can't feel your leg and it is still numb, then the ice may induce injury. Ice behind the knee can help as well. You may apply ice to your cast/splint/dressing for the first few days. 20 minutes of icing followed by 2 hours of no ice. Keep the splint/cast/dressing dry and place a towel between the ice and the dressing. DO NOT GET YOUR DRESSING/SPLINT/CAST WET!  5. While taking narcotics, you may have constipation.  A stool softener is recommended. You may use an over-the-counter stool softener or use the one that has been prescribed for you. Colace and Senokot are common over-the-counter medications.  6. Do not drink alcohol or drive while using narcotic pain medication.  7. Do NOT take additional tylenol if your pain medication already has tylenol in it     IMPORTANT INFORMATION:    Please call your surgeon' s clinic with questions Monday-Friday during business hours. If no one answers, please leave a message and someone " should get back to the patient within 24 hours.  For after-hours calls, please call the  and request to contact the answering service for Dr. Lindquist.   For emergencies, call 911 or present to the nearest ER for immediate evaluation.     FOLLOWUP INSTRUCTIONS:    Please contact [phone] to confirm scheduled follow-up appointment or to make changes to your scheduled appointment.

## 2025-05-09 NOTE — ANESTHESIA POSTPROCEDURE EVALUATION
Patient: Ankur Sanchez    Procedure Summary       Date: 05/09/25 Room / Location: [facility] OR 18 / Virtual [facility] OR    Anesthesia Start: 0734 Anesthesia Stop: 1141    Procedures:       Left Foot Joint Fusion; Lisfranc Dislocation (Left: Foot)      Foot Bone Excision; Bone Graft Repair (Left: Foot) Diagnosis:       Lisfranc dislocation, left, initial encounter      (Lisfranc dislocation, left, initial encounter [S93.325A])    Surgeons: Regan Lindquist MD Responsible Provider: Freddy Pierce MD    Anesthesia Type: general ASA Status: 2            Anesthesia Type: general    Vitals Value Taken Time   /79 05/09/25 12:16   Temp 36.6 °C (97.9 °F) 05/09/25 11:39   Pulse 78 05/09/25 12:29   Resp 18 05/09/25 12:29   SpO2 91 % 05/09/25 12:29   Vitals shown include unfiled device data.    Anesthesia Post Evaluation    Patient location during evaluation: bedside  Patient participation: complete - patient participated  Level of consciousness: awake  Pain management: adequate  Multimodal analgesia pain management approach  Airway patency: patent  Cardiovascular status: stable  Respiratory status: spontaneous ventilation and unassisted  Hydration status: acceptable  Postoperative Nausea and Vomiting: none  Comments: No significant PONV.      No notable events documented.

## 2025-05-10 LAB — STAPHYLOCOCCUS SPEC CULT: NORMAL

## 2025-05-19 ENCOUNTER — OFFICE VISIT (OUTPATIENT)
Dept: ORTHOPEDIC SURGERY | Facility: CLINIC | Age: 60
End: 2025-05-19
Payer: COMMERCIAL

## 2025-05-19 DIAGNOSIS — S93.325A LISFRANC DISLOCATION, LEFT, INITIAL ENCOUNTER: Primary | ICD-10-CM

## 2025-05-19 DIAGNOSIS — E11.9 DIABETES MELLITUS WITHOUT COMPLICATION: ICD-10-CM

## 2025-05-19 PROCEDURE — 4010F ACE/ARB THERAPY RXD/TAKEN: CPT | Performed by: STUDENT IN AN ORGANIZED HEALTH CARE EDUCATION/TRAINING PROGRAM

## 2025-05-19 PROCEDURE — 99211 OFF/OP EST MAY X REQ PHY/QHP: CPT | Performed by: STUDENT IN AN ORGANIZED HEALTH CARE EDUCATION/TRAINING PROGRAM

## 2025-05-19 RX ORDER — OXYCODONE HYDROCHLORIDE 5 MG/1
5-10 TABLET ORAL EVERY 4 HOURS PRN
Qty: 28 TABLET | Refills: 0 | Status: SHIPPED | OUTPATIENT
Start: 2025-05-19 | End: 2025-05-24

## 2025-05-19 ASSESSMENT — PAIN - FUNCTIONAL ASSESSMENT: PAIN_FUNCTIONAL_ASSESSMENT: 0-10

## 2025-05-19 ASSESSMENT — PAIN SCALES - GENERAL: PAINLEVEL_OUTOF10: 6

## 2025-05-19 NOTE — LETTER
May 19, 2025     Patient: Ankur Sanchez   YOB: 1965   Date of Visit: 5/19/2025       To Whom It May Concern:    Please allow Misha Sanchez time away from work on 6/2/2025 to drive his father Ankur to his surgical follow up appointment with Dr Lindquist.      If you have any questions or concerns, please don't hesitate to call.         Sincerely,        Regan Lindquist MD    CC: No Recipients

## 2025-05-19 NOTE — PROGRESS NOTES
ORTHOPAEDIC SURGERY OUTPATIENT PROGRESS NOTE    Chief Complaint:  Left foot pain    History Of Present Illness  Ankur Sanchez is a 60 y.o. male who presents for evaluation of left foot as a referral from Dr. Larsen.  Patient sustained an injury from 04/18/2025, he tripped after getting out of his bed with immediate pain and difficulty bearing weight in the left foot.  Patient carries a diagnosis of neuropathy with diabetes.  He was seen in an ER setting where imaging was obtained including x-ray and CT that was concerning for a Lisfranc variant injury.  He later followed up with an orthopedic provider and was referred for evaluation.  He has been nonweightbearing in his left lower extremity utilizing knee scooter for assisted ambulation.  He has been taking narcotics for pain control.    Patient has DM2 patient believes his last HbA1c was approximately 6% in spring she had to, 1 pack/day smoker, no current anticoagulation.    04/29/2025: Patient returns for follow-up of his left foot injury as above.  Patient has been elevating his extremity and has cut back on smoking although has not completely stopped at this point.  He is awaiting nicotine patches to be sent by another provider.  He is reporting 6 out of 10 pain.  He has researched his injury extensively.    05/19/2025: Patient returns s/p left Lisfranc realignment arthrodesis from 5/9/2025.  Patient is reporting 6 out of 10 pain, he did have durable pain relief following postoperative block.  He has been compliant with nonweightbearing restrictions.  He unfortunately continues to smoke.    Past Medical History  Medical History[1]    Surgical History  Recent Surgeries in Orthopaedic Surgery            No cases to display             Social History  Social History[2]    Family History  Family History[3]     Allergies  RX Allergies[4]    Review of Systems  REVIEW OF SYSTEMS  Constitutional: no unplanned weight loss  Psychiatric: no suicidal ideation  ENT: no  vision changes, no sinus problems  Pulmonary: no shortness of breath  Lymphatic: no enlarged lymph nodes  Cardiovascular: no chest pain or shortness of breath  Gastrointestinal: no stomach problems  Genitourinary: no dysuria   Skin: no rashes  Endocrine: no thyroid problems  Neurological: no headache, no numbness  Hematological: no easy bruising  Musculoskeletal: Left foot pain     Physical Exam  PHYSICAL EXAMINATION  Constitutional Exam: well developed and well nourished  Psychiatric Exam: alert and oriented, appropriate mood and behavior  Eye Exam: EOMI  Pulmonary Exam: breathing non-labored, no apparent distress  Lymphatic exam: no appreciable lymphadenopathy in the lower extremities  Cardiovascular exam: RRR to peripheral palpation, DP pulses 2+, PT 2+, toes are pink with good capillary refill, no pitting edema  Skin exam: no open lesions, rashes, abrasions or ulcerations  Neurological exam: sensation to light touch intact in both lower extremities in peripheral and dermatomal distributions (except for any abnormalities noted in musculoskeletal exam)    Musculoskeletal exam: Left lower extremity examination.  Iliac brim incision with skin edges and suture line well-approximated.  Patient modified dorsal incision with skin edges and suture line well-approximated.  Distal one third segment of wound with expected samantha-incisional erythema without induration, fluctuance or drainage.  Patient has sensation grossly intact to light touch in the distal extremity.  He has intact but pain limited PF/DF/EHL and 1+ DP/PT pulses palpated.     Last Recorded Vitals  There were no vitals taken for this visit.    Laboratory Results  No results found for this or any previous visit (from the past 24 hours).     Radiology Results  No new imaging at this visit.    Assessment/Plan:  59-year-old male 1 pack/day smoker with history of diabetes and concern for neuropathy s/p left Lisfranc realignment arthrodesis from 5/9/2025.  I would  recommend the patient maintain strict nonweightbearing his left lower extremity in a short leg fiberglass cast.  I will retain his sutures today and have encouraged continuous extremity elevation for both pain and swelling control.  Due to his severe fracture related pain, I will provide him with a one-time refill of oxycodone after an appropriate ORS report was reviewed verify no concerning findings or abuse.  I will plan to see the patient back in 2 weeks for repeat clinical and radiographic evaluation.  Upon return patient would require three-view nonweightbearing left foot out of fiberglass.    Regan Lindquist MD, HAZEL  Department of Orthopaedic Surgery  [facility]    The diagnosis and treatment plan were reviewed with the patient. All questions were answered. The patient verbalized understanding of the treatment plan. There were no barriers to understanding identified.    Note dictated with [employer] software.  Completed without full type editing and sent to avoid delay.       [1]   Past Medical History:  Diagnosis Date    Diabetes mellitus (Multi)     a1C= 7.1% on 4/24/25    ED (erectile dysfunction)     HLD (hyperlipidemia)     Hypertension     Lisfranc dislocation, left, initial encounter     left foot    Neuropathy     N/T left foot   [2]   Social History  Socioeconomic History    Marital status:    Tobacco Use    Smoking status: Every Day     Current packs/day: 2.00     Types: Cigarettes    Smokeless tobacco: Never    Tobacco comments:     Trying to quit, down to 1/2 PPD from 2 PPD, has smoked for over 30 years   Vaping Use    Vaping status: Never Used   Substance and Sexual Activity    Alcohol use: Yes     Alcohol/week: 12.0 standard drinks of alcohol     Types: 12 Standard drinks or equivalent per week     Comment: drinks alcohol 2-3 days a week and will have 3-4 drinks    Drug use: Never    Sexual activity: Defer   [3]   Family  History  Problem Relation Name Age of Onset    Diabetes Mother      Hypertension Mother      Cancer Mother      No Known Problems Father      Cancer Sister      Cancer Sister      Other (respiratory failure) Sister      Heart disease Sister     [4] No Known Allergies

## 2025-06-02 ENCOUNTER — HOSPITAL ENCOUNTER (OUTPATIENT)
Dept: RADIOLOGY | Facility: CLINIC | Age: 60
Discharge: HOME | End: 2025-06-02
Payer: COMMERCIAL

## 2025-06-02 ENCOUNTER — OFFICE VISIT (OUTPATIENT)
Dept: ORTHOPEDIC SURGERY | Facility: CLINIC | Age: 60
End: 2025-06-02
Payer: COMMERCIAL

## 2025-06-02 DIAGNOSIS — S93.325A LISFRANC DISLOCATION, LEFT, INITIAL ENCOUNTER: ICD-10-CM

## 2025-06-02 PROCEDURE — 73630 X-RAY EXAM OF FOOT: CPT | Mod: LEFT SIDE | Performed by: RADIOLOGY

## 2025-06-02 PROCEDURE — 99211 OFF/OP EST MAY X REQ PHY/QHP: CPT | Performed by: STUDENT IN AN ORGANIZED HEALTH CARE EDUCATION/TRAINING PROGRAM

## 2025-06-02 PROCEDURE — 73630 X-RAY EXAM OF FOOT: CPT | Mod: LT

## 2025-06-02 NOTE — PROGRESS NOTES
ORTHOPAEDIC SURGERY OUTPATIENT PROGRESS NOTE    Chief Complaint:  Left foot pain    History Of Present Illness  Ankur Sanchez is a 60 y.o. male who presents for evaluation of left foot as a referral from Dr. Larsen.  Patient sustained an injury from 04/18/2025, he tripped after getting out of his bed with immediate pain and difficulty bearing weight in the left foot.  Patient carries a diagnosis of neuropathy with diabetes.  He was seen in an ER setting where imaging was obtained including x-ray and CT that was concerning for a Lisfranc variant injury.  He later followed up with an orthopedic provider and was referred for evaluation.  He has been nonweightbearing in his left lower extremity utilizing knee scooter for assisted ambulation.  He has been taking narcotics for pain control.    Patient has DM2 patient believes his last HbA1c was approximately 6% in spring she had to, 1 pack/day smoker, no current anticoagulation.    04/29/2025: Patient returns for follow-up of his left foot injury as above.  Patient has been elevating his extremity and has cut back on smoking although has not completely stopped at this point.  He is awaiting nicotine patches to be sent by another provider.  He is reporting 6 out of 10 pain.  He has researched his injury extensively.    05/19/2025: Patient returns s/p left Lisfranc realignment arthrodesis from 5/9/2025.  Patient is reporting 6 out of 10 pain, he did have durable pain relief following postoperative block.  He has been compliant with nonweightbearing restrictions.  He unfortunately continues to smoke.    06/02/2025: Patient returns s/p left Lisfranc realignment arthrodesis from 5/9/2025.  Patient is currently reporting improved pain overall.  He has been compliant with weightbearing restrictions.  He unfortunately continues to utilize tobacco products.  He has noticed a sensation of the sutures catching as his swelling has decreased.  He has been without fevers, chills or  night sweats.    Past Medical History  Medical History[1]    Surgical History  Recent Surgeries in Orthopaedic Surgery            No cases to display             Social History  Social History[2]    Family History  Family History[3]     Allergies  RX Allergies[4]    Review of Systems  REVIEW OF SYSTEMS  Constitutional: no unplanned weight loss  Psychiatric: no suicidal ideation  ENT: no vision changes, no sinus problems  Pulmonary: no shortness of breath  Lymphatic: no enlarged lymph nodes  Cardiovascular: no chest pain or shortness of breath  Gastrointestinal: no stomach problems  Genitourinary: no dysuria   Skin: no rashes  Endocrine: no thyroid problems  Neurological: no headache, no numbness  Hematological: no easy bruising  Musculoskeletal: Left foot pain     Physical Exam  PHYSICAL EXAMINATION  Constitutional Exam: well developed and well nourished  Psychiatric Exam: alert and oriented, appropriate mood and behavior  Eye Exam: EOMI  Pulmonary Exam: breathing non-labored, no apparent distress  Lymphatic exam: no appreciable lymphadenopathy in the lower extremities  Cardiovascular exam: RRR to peripheral palpation, DP pulses 2+, PT 2+, toes are pink with good capillary refill, no pitting edema  Skin exam: no open lesions, rashes, abrasions or ulcerations  Neurological exam: sensation to light touch intact in both lower extremities in peripheral and dermatomal distributions (except for any abnormalities noted in musculoskeletal exam)    Musculoskeletal exam: Left lower extremity examination.  Iliac brim incision well-healed.  Patient modified dorsal midfoot incision with skin edges and suture line well-healed in the most distal and proximal third.  The central distal third segment with improved overall samantha-incisional erythema without induration, fluctuance, drainage or expressible drainage.  Patient has sensation retained to light touch in distal extremity with intact but pain limited PF/DF/EHL and 1+ DP/PT  pulses palpated.    Last Recorded Vitals  There were no vitals taken for this visit.    Laboratory Results  No results found for this or any previous visit (from the past 24 hours).     Radiology Results  X-ray imaging 3 view nonweightbearing left foot reviewed and independently evaluated by me on 6/2/2025 demonstrates maintained alignment as well as compression following 1 through 3 MTC arthrodesis with dorsal bridge plate fixation and retrograde Lisfranc screw.  There is early bridging trabecular bone noted without samantha-implant fracture, lucency or loosening.    Assessment/Plan:  60-year-old male 1 pack/day smoker with history of diabetes and concern for neuropathy s/p left Lisfranc realignment arthrodesis from 5/9/2025 with both clinical and radiographic evidence of healing.  I would recommend the patient maintain strict nonweightbearing his left lower extremity in a short leg fiberglass cast.  I will retain his sutures today and have encouraged continued elevation for both pain and swelling control.  I have again encouraged the patient to immediately stop use of tobacco products and reviewed that his wound will remain at risk for delayed healing as well as the potential for infection.  Given the overall clinical improvement, would not proceed with oral antibiotics at this visit but may give future consideration to if not clinically improved.  I will plan to see the patient back next week for repeat clinical evaluation.  I encouraged the patient to contact the office if he develops any new pain, worsening symptoms if he has any further questions.  Upon return patient would not require further imaging but when checked out of fiberglass.    Regan Lindquist MD, HAZEL  Department of Orthopaedic Surgery  [facility]    The diagnosis and treatment plan were reviewed with the patient. All questions were answered. The patient verbalized understanding of the treatment plan. There were  no barriers to understanding identified.    Note dictated with [email or web address] software.  Completed without full type editing and sent to avoid delay.         [1]   Past Medical History:  Diagnosis Date    Diabetes mellitus (Multi)     a1C= 7.1% on 4/24/25    ED (erectile dysfunction)     HLD (hyperlipidemia)     Hypertension     Lisfranc dislocation, left, initial encounter     left foot    Neuropathy     N/T left foot   [2]   Social History  Socioeconomic History    Marital status:    Tobacco Use    Smoking status: Every Day     Current packs/day: 2.00     Types: Cigarettes    Smokeless tobacco: Never    Tobacco comments:     Trying to quit, down to 1/2 PPD from 2 PPD, has smoked for over 30 years   Vaping Use    Vaping status: Never Used   Substance and Sexual Activity    Alcohol use: Yes     Alcohol/week: 12.0 standard drinks of alcohol     Types: 12 Standard drinks or equivalent per week     Comment: drinks alcohol 2-3 days a week and will have 3-4 drinks    Drug use: Never    Sexual activity: Defer   [3]   Family History  Problem Relation Name Age of Onset    Diabetes Mother      Hypertension Mother      Cancer Mother      No Known Problems Father      Cancer Sister      Cancer Sister      Other (respiratory failure) Sister      Heart disease Sister     [4] No Known Allergies

## 2025-06-10 ENCOUNTER — OFFICE VISIT (OUTPATIENT)
Dept: ORTHOPEDIC SURGERY | Facility: CLINIC | Age: 60
End: 2025-06-10
Payer: COMMERCIAL

## 2025-06-10 DIAGNOSIS — E11.9 DIABETES MELLITUS WITHOUT COMPLICATION: ICD-10-CM

## 2025-06-10 DIAGNOSIS — Z72.0 TOBACCO USE: ICD-10-CM

## 2025-06-10 DIAGNOSIS — S93.325A LISFRANC DISLOCATION, LEFT, INITIAL ENCOUNTER: Primary | ICD-10-CM

## 2025-06-10 DIAGNOSIS — T14.8XXD DELAYED WOUND HEALING: ICD-10-CM

## 2025-06-10 PROCEDURE — 4010F ACE/ARB THERAPY RXD/TAKEN: CPT | Performed by: STUDENT IN AN ORGANIZED HEALTH CARE EDUCATION/TRAINING PROGRAM

## 2025-06-10 PROCEDURE — 99024 POSTOP FOLLOW-UP VISIT: CPT | Performed by: STUDENT IN AN ORGANIZED HEALTH CARE EDUCATION/TRAINING PROGRAM

## 2025-06-10 PROCEDURE — 99214 OFFICE O/P EST MOD 30 MIN: CPT | Performed by: STUDENT IN AN ORGANIZED HEALTH CARE EDUCATION/TRAINING PROGRAM

## 2025-06-10 PROCEDURE — L4361 PNEUMA/VAC WALK BOOT PRE OTS: HCPCS | Performed by: STUDENT IN AN ORGANIZED HEALTH CARE EDUCATION/TRAINING PROGRAM

## 2025-06-10 NOTE — PROGRESS NOTES
ORTHOPAEDIC SURGERY OUTPATIENT PROGRESS NOTE    Chief Complaint:  Left foot pain    History Of Present Illness  Ankur Sanchez is a 60 y.o. male who presents for evaluation of left foot as a referral from Dr. Larsen.  Patient sustained an injury from 04/18/2025, he tripped after getting out of his bed with immediate pain and difficulty bearing weight in the left foot.  Patient carries a diagnosis of neuropathy with diabetes.  He was seen in an ER setting where imaging was obtained including x-ray and CT that was concerning for a Lisfranc variant injury.  He later followed up with an orthopedic provider and was referred for evaluation.  He has been nonweightbearing in his left lower extremity utilizing knee scooter for assisted ambulation.  He has been taking narcotics for pain control.    Patient has DM2 patient believes his last HbA1c was approximately 6% in spring she had to, 1 pack/day smoker, no current anticoagulation.    04/29/2025: Patient returns for follow-up of his left foot injury as above.  Patient has been elevating his extremity and has cut back on smoking although has not completely stopped at this point.  He is awaiting nicotine patches to be sent by another provider.  He is reporting 6 out of 10 pain.  He has researched his injury extensively.    05/19/2025: Patient returns s/p left Lisfranc realignment arthrodesis from 5/9/2025.  Patient is reporting 6 out of 10 pain, he did have durable pain relief following postoperative block.  He has been compliant with nonweightbearing restrictions.  He unfortunately continues to smoke.    06/02/2025: Patient returns s/p left Lisfranc realignment arthrodesis from 5/9/2025.  Patient is currently reporting improved pain overall.  He has been compliant with weightbearing restrictions.  He unfortunately continues to utilize tobacco products.  He has noticed a sensation of the sutures catching as his swelling has decreased.  He has been without fevers, chills or  night sweats.    06/10/2025: Patient returns s/p left Lisfranc realignment arthrodesis from 5/9/2025.  Patient is reporting improved pain.  He has continued to elevate his extremity and has been compliant with weightbearing restrictions.  He reports to be working on tobacco cessation.  He is present with his wife today.    Past Medical History  Medical History[1]    Surgical History  Recent Surgeries in Orthopaedic Surgery            No cases to display             Social History  Social History[2]    Family History  Family History[3]     Allergies  RX Allergies[4]    Review of Systems  REVIEW OF SYSTEMS  Constitutional: no unplanned weight loss  Psychiatric: no suicidal ideation  ENT: no vision changes, no sinus problems  Pulmonary: no shortness of breath  Lymphatic: no enlarged lymph nodes  Cardiovascular: no chest pain or shortness of breath  Gastrointestinal: no stomach problems  Genitourinary: no dysuria   Skin: no rashes  Endocrine: no thyroid problems  Neurological: no headache, no numbness  Hematological: no easy bruising  Musculoskeletal: Left foot pain     Physical Exam  PHYSICAL EXAMINATION  Constitutional Exam: well developed and well nourished  Psychiatric Exam: alert and oriented, appropriate mood and behavior  Eye Exam: EOMI  Pulmonary Exam: breathing non-labored, no apparent distress  Lymphatic exam: no appreciable lymphadenopathy in the lower extremities  Cardiovascular exam: RRR to peripheral palpation, DP pulses 2+, PT 2+, toes are pink with good capillary refill, no pitting edema  Skin exam: no open lesions, rashes, abrasions or ulcerations  Neurological exam: sensation to light touch intact in both lower extremities in peripheral and dermatomal distributions (except for any abnormalities noted in musculoskeletal exam)    Musculoskeletal exam: Left lower extremity examination.  Iliac brim incision well-healed.  Patient modified dorsal midfoot incision well-healed except for central approximately  2 cm region with granulation tissue/fibrinous exudate visualized.  Skin edges and suture line otherwise well-approximated.  There is no samantha-incisional erythema, duration, fluctuance or drainage.  Overall edema markedly improved. Patient has sensation retained to light touch in distal extremity with intact but pain limited PF/DF/EHL and 1+ DP/PT pulses palpated.    Last Recorded Vitals  There were no vitals taken for this visit.    Laboratory Results  No results found for this or any previous visit (from the past 24 hours).     Radiology Results  X-ray imaging 3 view nonweightbearing left foot reviewed and independently evaluated by me on 6/2/2025 demonstrates maintained alignment as well as compression following 1 through 3 MTC arthrodesis with dorsal bridge plate fixation and retrograde Lisfranc screw.  There is early bridging trabecular bone noted without samantha-implant fracture, lucency or loosening.    Assessment/Plan:  60-year-old male 1 pack/day smoker with history of diabetes and concern for neuropathy s/p left Lisfranc realignment arthrodesis from 5/9/2025 with both clinical and radiographic evidence of healing.  I would recommend the patient maintain strict nonweightbearing to his left lower extremity.  I will transition him to a nonweightbearing walking boot for ease of wound monitoring.  I will provide him with a formal referral to wound care and have again encouraged immediate tobacco cessation.  Clinically, wound does not appear infected at this point and I would not recommend coverage with oral antibiotics.  I have encouraged the patient to contact the office if he develops any new pain, worsening symptoms fevers, chills, night sweats or concern regarding the wound.  I would plan to see the patient back in 2 weeks for repeat clinical and radiographic evaluation.  Upon return patient would require three-view nonweightbearing left foot.    Regan Lindquist MD, HAZEL  Department of Orthopaedic  Surgery  [facility]    The diagnosis and treatment plan were reviewed with the patient. All questions were answered. The patient verbalized understanding of the treatment plan. There were no barriers to understanding identified.    Note dictated with [employer] software.  Completed without full type editing and sent to avoid delay.       [1]   Past Medical History:  Diagnosis Date    Diabetes mellitus (Multi)     a1C= 7.1% on 4/24/25    ED (erectile dysfunction)     HLD (hyperlipidemia)     Hypertension     Lisfranc dislocation, left, initial encounter     left foot    Neuropathy     N/T left foot   [2]   Social History  Socioeconomic History    Marital status:    Tobacco Use    Smoking status: Every Day     Current packs/day: 2.00     Types: Cigarettes    Smokeless tobacco: Never    Tobacco comments:     Trying to quit, down to 1/2 PPD from 2 PPD, has smoked for over 30 years   Vaping Use    Vaping status: Never Used   Substance and Sexual Activity    Alcohol use: Yes     Alcohol/week: 12.0 standard drinks of alcohol     Types: 12 Standard drinks or equivalent per week     Comment: drinks alcohol 2-3 days a week and will have 3-4 drinks    Drug use: Never    Sexual activity: Defer   [3]   Family History  Problem Relation Name Age of Onset    Diabetes Mother      Hypertension Mother      Cancer Mother      No Known Problems Father      Cancer Sister      Cancer Sister      Other (respiratory failure) Sister      Heart disease Sister     [4] No Known Allergies

## 2025-06-24 ENCOUNTER — HOSPITAL ENCOUNTER (OUTPATIENT)
Dept: RADIOLOGY | Facility: CLINIC | Age: 60
Discharge: HOME | End: 2025-06-24
Payer: COMMERCIAL

## 2025-06-24 ENCOUNTER — OFFICE VISIT (OUTPATIENT)
Dept: WOUND CARE | Facility: CLINIC | Age: 60
End: 2025-06-24
Payer: COMMERCIAL

## 2025-06-24 ENCOUNTER — OFFICE VISIT (OUTPATIENT)
Dept: ORTHOPEDIC SURGERY | Facility: CLINIC | Age: 60
End: 2025-06-24
Payer: COMMERCIAL

## 2025-06-24 DIAGNOSIS — E11.9 DIABETES MELLITUS WITHOUT COMPLICATION: ICD-10-CM

## 2025-06-24 DIAGNOSIS — S93.325A LISFRANC DISLOCATION, LEFT, INITIAL ENCOUNTER: ICD-10-CM

## 2025-06-24 DIAGNOSIS — T14.8XXD DELAYED WOUND HEALING: ICD-10-CM

## 2025-06-24 DIAGNOSIS — Z72.0 TOBACCO USE: ICD-10-CM

## 2025-06-24 PROCEDURE — 99212 OFFICE O/P EST SF 10 MIN: CPT | Performed by: STUDENT IN AN ORGANIZED HEALTH CARE EDUCATION/TRAINING PROGRAM

## 2025-06-24 PROCEDURE — 73630 X-RAY EXAM OF FOOT: CPT | Mod: LEFT SIDE | Performed by: RADIOLOGY

## 2025-06-24 PROCEDURE — 29581 APPL MULTLAYER CMPRN SYS LEG: CPT | Mod: LT

## 2025-06-24 PROCEDURE — 11042 DBRDMT SUBQ TIS 1ST 20SQCM/<: CPT

## 2025-06-24 PROCEDURE — 99024 POSTOP FOLLOW-UP VISIT: CPT | Performed by: STUDENT IN AN ORGANIZED HEALTH CARE EDUCATION/TRAINING PROGRAM

## 2025-06-24 PROCEDURE — 99213 OFFICE O/P EST LOW 20 MIN: CPT | Mod: 25

## 2025-06-24 PROCEDURE — 73630 X-RAY EXAM OF FOOT: CPT | Mod: LT

## 2025-06-24 ASSESSMENT — PAIN - FUNCTIONAL ASSESSMENT: PAIN_FUNCTIONAL_ASSESSMENT: 0-10

## 2025-06-24 ASSESSMENT — PAIN SCALES - GENERAL: PAINLEVEL_OUTOF10: 6

## 2025-06-24 ASSESSMENT — PAIN DESCRIPTION - DESCRIPTORS: DESCRIPTORS: ACHING;DULL;THROBBING

## 2025-06-24 NOTE — PROGRESS NOTES
ORTHOPAEDIC SURGERY OUTPATIENT PROGRESS NOTE    Chief Complaint:  Left foot pain    History Of Present Illness  Ankur Sanchez is a 60 y.o. male who presents for evaluation of left foot as a referral from Dr. Larsen.  Patient sustained an injury from 04/18/2025, he tripped after getting out of his bed with immediate pain and difficulty bearing weight in the left foot.  Patient carries a diagnosis of neuropathy with diabetes.  He was seen in an ER setting where imaging was obtained including x-ray and CT that was concerning for a Lisfranc variant injury.  He later followed up with an orthopedic provider and was referred for evaluation.  He has been nonweightbearing in his left lower extremity utilizing knee scooter for assisted ambulation.  He has been taking narcotics for pain control.    Patient has DM2 patient believes his last HbA1c was approximately 6% in spring she had to, 1 pack/day smoker, no current anticoagulation.    04/29/2025: Patient returns for follow-up of his left foot injury as above.  Patient has been elevating his extremity and has cut back on smoking although has not completely stopped at this point.  He is awaiting nicotine patches to be sent by another provider.  He is reporting 6 out of 10 pain.  He has researched his injury extensively.    05/19/2025: Patient returns s/p left Lisfranc realignment arthrodesis from 5/9/2025.  Patient is reporting 6 out of 10 pain, he did have durable pain relief following postoperative block.  He has been compliant with nonweightbearing restrictions.  He unfortunately continues to smoke.    06/02/2025: Patient returns s/p left Lisfranc realignment arthrodesis from 5/9/2025.  Patient is currently reporting improved pain overall.  He has been compliant with weightbearing restrictions.  He unfortunately continues to utilize tobacco products.  He has noticed a sensation of the sutures catching as his swelling has decreased.  He has been without fevers, chills or  night sweats.    06/10/2025: Patient returns s/p left Lisfranc realignment arthrodesis from 5/9/2025.  Patient is reporting improved pain.  He has continued to elevate his extremity and has been compliant with weightbearing restrictions.  He reports to be working on tobacco cessation.  He is present with his wife today.    06/24/2025: Patient returns s/p left Lisfranc realignment arthrodesis from 5/9/2025.  He is reporting 6 out of 10 pain.  He has been elevating his extremity and maintain compliance with weightbearing restrictions.  He is scheduled for wound care later today.  He has been changing his dressings and noted decreased drainage.  He is present with his wife today.    Past Medical History  Medical History[1]    Surgical History  Recent Surgeries in Orthopaedic Surgery            No cases to display             Social History  Social History[2]    Family History  Family History[3]     Allergies  RX Allergies[4]    Review of Systems  REVIEW OF SYSTEMS  Constitutional: no unplanned weight loss  Psychiatric: no suicidal ideation  ENT: no vision changes, no sinus problems  Pulmonary: no shortness of breath  Lymphatic: no enlarged lymph nodes  Cardiovascular: no chest pain or shortness of breath  Gastrointestinal: no stomach problems  Genitourinary: no dysuria   Skin: no rashes  Endocrine: no thyroid problems  Neurological: no headache, no numbness  Hematological: no easy bruising  Musculoskeletal: Left foot pain     Physical Exam  PHYSICAL EXAMINATION  Constitutional Exam: well developed and well nourished  Psychiatric Exam: alert and oriented, appropriate mood and behavior  Eye Exam: EOMI  Pulmonary Exam: breathing non-labored, no apparent distress  Lymphatic exam: no appreciable lymphadenopathy in the lower extremities  Cardiovascular exam: RRR to peripheral palpation, DP pulses 2+, PT 2+, toes are pink with good capillary refill, no pitting edema  Skin exam: no open lesions, rashes, abrasions or  ulcerations  Neurological exam: sensation to light touch intact in both lower extremities in peripheral and dermatomal distributions (except for any abnormalities noted in musculoskeletal exam)    Musculoskeletal exam: Left lower extremity examination.  Iliac brim incision well-healed.  Patient modified dorsal midfoot incision well-healed except for central approximately, there is overall improvement as compared to previous evaluation. There is no samantha-incisional erythema, duration, fluctuance or drainage.  Overall edema markedly improved. Patient has sensation retained to light touch in distal extremity with intact but pain limited PF/DF/EHL and 1+ DP/PT pulses palpated.    Last Recorded Vitals  There were no vitals taken for this visit.    Laboratory Results  No results found for this or any previous visit (from the past 24 hours).     Radiology Results  X-ray imaging 3 view nonweightbearing left foot reviewed and independently evaluated by me on 6/24/2025 demonstrates maintained alignment as well as compression following 1st through 3rd metatarsocuneiform arthrodesis with dorsal bridge plate fixation and retrograde Lisfranc screw.  Continued note of early trabecular bridging, particularly at the 1st and 2nd.  There is no samantha-implant fracture, lucency or loosening.    Assessment/Plan:  60-year-old male 1 pack/day smoker with history of diabetes and concern for neuropathy s/p left Lisfranc realignment arthrodesis from 5/9/2025 with both clinical and radiographic evidence of healing.  I would recommend the patient maintain strict nonweightbearing to his left lower extremity.  I would recommend immediate tobacco cessation and follow-up with wound care.  I am hopeful that the wound would continue to improve based on the clinical course to date, discussed possibility of requiring return to the operating room if not healing pending CT scan to assess for arthrodesis healing.  I would plan to see the patient back no  later than 3 weeks for repeat clinical and radiographic evaluation.  Upon return patient require three-view simulated weightbearing left foot.    Regan Lindquist MD, HAZEL  Department of Orthopaedic Surgery  [facility]    The diagnosis and treatment plan were reviewed with the patient. All questions were answered. The patient verbalized understanding of the treatment plan. There were no barriers to understanding identified.    Note dictated with [employer] software.  Completed without full type editing and sent to avoid delay.         [1]   Past Medical History:  Diagnosis Date    Diabetes mellitus (Multi)     a1C= 7.1% on 4/24/25    ED (erectile dysfunction)     HLD (hyperlipidemia)     Hypertension     Lisfranc dislocation, left, initial encounter     left foot    Neuropathy     N/T left foot   [2]   Social History  Socioeconomic History    Marital status:    Tobacco Use    Smoking status: Every Day     Current packs/day: 2.00     Types: Cigarettes    Smokeless tobacco: Never    Tobacco comments:     Trying to quit, down to 1/2 PPD from 2 PPD, has smoked for over 30 years   Vaping Use    Vaping status: Never Used   Substance and Sexual Activity    Alcohol use: Yes     Alcohol/week: 12.0 standard drinks of alcohol     Types: 12 Standard drinks or equivalent per week     Comment: drinks alcohol 2-3 days a week and will have 3-4 drinks    Drug use: Never    Sexual activity: Defer   [3]   Family History  Problem Relation Name Age of Onset    Diabetes Mother      Hypertension Mother      Cancer Mother      No Known Problems Father      Cancer Sister      Cancer Sister      Other (respiratory failure) Sister      Heart disease Sister     [4] No Known Allergies

## 2025-06-26 ENCOUNTER — CLINICAL SUPPORT (OUTPATIENT)
Dept: WOUND CARE | Facility: CLINIC | Age: 60
End: 2025-06-26
Payer: COMMERCIAL

## 2025-07-01 ENCOUNTER — OFFICE VISIT (OUTPATIENT)
Dept: WOUND CARE | Facility: CLINIC | Age: 60
End: 2025-07-01
Payer: COMMERCIAL

## 2025-07-01 PROCEDURE — 11042 DBRDMT SUBQ TIS 1ST 20SQCM/<: CPT

## 2025-07-08 ENCOUNTER — OFFICE VISIT (OUTPATIENT)
Dept: ORTHOPEDIC SURGERY | Facility: CLINIC | Age: 60
End: 2025-07-08
Payer: COMMERCIAL

## 2025-07-08 ENCOUNTER — HOSPITAL ENCOUNTER (OUTPATIENT)
Dept: RADIOLOGY | Facility: CLINIC | Age: 60
Discharge: HOME | End: 2025-07-08
Payer: COMMERCIAL

## 2025-07-08 ENCOUNTER — OFFICE VISIT (OUTPATIENT)
Dept: WOUND CARE | Facility: CLINIC | Age: 60
End: 2025-07-08
Payer: COMMERCIAL

## 2025-07-08 DIAGNOSIS — S93.325A LISFRANC DISLOCATION, LEFT, INITIAL ENCOUNTER: ICD-10-CM

## 2025-07-08 PROCEDURE — 73630 X-RAY EXAM OF FOOT: CPT | Mod: LT

## 2025-07-08 PROCEDURE — 99024 POSTOP FOLLOW-UP VISIT: CPT | Performed by: STUDENT IN AN ORGANIZED HEALTH CARE EDUCATION/TRAINING PROGRAM

## 2025-07-08 PROCEDURE — 11042 DBRDMT SUBQ TIS 1ST 20SQCM/<: CPT

## 2025-07-08 PROCEDURE — 99212 OFFICE O/P EST SF 10 MIN: CPT | Performed by: STUDENT IN AN ORGANIZED HEALTH CARE EDUCATION/TRAINING PROGRAM

## 2025-07-08 PROCEDURE — 73630 X-RAY EXAM OF FOOT: CPT | Mod: LEFT SIDE | Performed by: RADIOLOGY

## 2025-07-08 ASSESSMENT — PAIN DESCRIPTION - DESCRIPTORS: DESCRIPTORS: ACHING

## 2025-07-08 ASSESSMENT — PAIN SCALES - GENERAL: PAINLEVEL_OUTOF10: 3

## 2025-07-08 ASSESSMENT — PAIN - FUNCTIONAL ASSESSMENT: PAIN_FUNCTIONAL_ASSESSMENT: 0-10

## 2025-07-08 NOTE — PROGRESS NOTES
ORTHOPAEDIC SURGERY OUTPATIENT PROGRESS NOTE    Chief Complaint:  Left foot pain    History Of Present Illness  Ankur Sanchez is a 60 y.o. male who presents for evaluation of left foot as a referral from Dr. Larsen.  Patient sustained an injury from 04/18/2025, he tripped after getting out of his bed with immediate pain and difficulty bearing weight in the left foot.  Patient carries a diagnosis of neuropathy with diabetes.  He was seen in an ER setting where imaging was obtained including x-ray and CT that was concerning for a Lisfranc variant injury.  He later followed up with an orthopedic provider and was referred for evaluation.  He has been nonweightbearing in his left lower extremity utilizing knee scooter for assisted ambulation.  He has been taking narcotics for pain control.    Patient has DM2 patient believes his last HbA1c was approximately 6% in spring she had to, 1 pack/day smoker, no current anticoagulation.    04/29/2025: Patient returns for follow-up of his left foot injury as above.  Patient has been elevating his extremity and has cut back on smoking although has not completely stopped at this point.  He is awaiting nicotine patches to be sent by another provider.  He is reporting 6 out of 10 pain.  He has researched his injury extensively.    05/19/2025: Patient returns s/p left Lisfranc realignment arthrodesis from 5/9/2025.  Patient is reporting 6 out of 10 pain, he did have durable pain relief following postoperative block.  He has been compliant with nonweightbearing restrictions.  He unfortunately continues to smoke.    06/02/2025: Patient returns s/p left Lisfranc realignment arthrodesis from 5/9/2025.  Patient is currently reporting improved pain overall.  He has been compliant with weightbearing restrictions.  He unfortunately continues to utilize tobacco products.  He has noticed a sensation of the sutures catching as his swelling has decreased.  He has been without fevers, chills or  night sweats.    06/10/2025: Patient returns s/p left Lisfranc realignment arthrodesis from 5/9/2025.  Patient is reporting improved pain.  He has continued to elevate his extremity and has been compliant with weightbearing restrictions.  He reports to be working on tobacco cessation.  He is present with his wife today.    06/24/2025: Patient returns s/p left Lisfranc realignment arthrodesis from 5/9/2025.  He is reporting 6 out of 10 pain.  He has been elevating his extremity and maintain compliance with weightbearing restrictions.  He is scheduled for wound care later today.  He has been changing his dressings and noted decreased drainage.  He is present with his wife today.    07/08/2025: Patient returns s/p left Lisfranc realignment arthrodesis from 5/9/2025.  Patient is reporting 3 out of 10 pain.  He has been in wound care.  He has been elevating his extremity.  He has continued to make progress today.  He is present with his wife today.    Past Medical History  Medical History[1]    Surgical History  Recent Surgeries in Orthopaedic Surgery            No cases to display             Social History  Social History[2]    Family History  Family History[3]     Allergies  RX Allergies[4]    Review of Systems  REVIEW OF SYSTEMS  Constitutional: no unplanned weight loss  Psychiatric: no suicidal ideation  ENT: no vision changes, no sinus problems  Pulmonary: no shortness of breath  Lymphatic: no enlarged lymph nodes  Cardiovascular: no chest pain or shortness of breath  Gastrointestinal: no stomach problems  Genitourinary: no dysuria   Skin: no rashes  Endocrine: no thyroid problems  Neurological: no headache, no numbness  Hematological: no easy bruising  Musculoskeletal: Left foot pain     Physical Exam  PHYSICAL EXAMINATION  Constitutional Exam: well developed and well nourished  Psychiatric Exam: alert and oriented, appropriate mood and behavior  Eye Exam: EOMI  Pulmonary Exam: breathing non-labored, no apparent  distress  Lymphatic exam: no appreciable lymphadenopathy in the lower extremities  Cardiovascular exam: RRR to peripheral palpation, DP pulses 2+, PT 2+, toes are pink with good capillary refill, no pitting edema  Skin exam: no open lesions, rashes, abrasions or ulcerations  Neurological exam: sensation to light touch intact in both lower extremities in peripheral and dermatomal distributions (except for any abnormalities noted in musculoskeletal exam)    Musculoskeletal exam: Left lower extremity examination.  Iliac brim incision well-healed.  Patient modified dorsal midfoot incision with continued consolidation centrally, notably improved from prior examination.  Notable improvement in edema overall as well. Patient has sensation retained to light touch in distal extremity with intact but pain limited PF/DF/EHL and 1+ DP/PT pulses palpated.    Last Recorded Vitals  There were no vitals taken for this visit.    Laboratory Results  No results found for this or any previous visit (from the past 24 hours).     Radiology Results  X-ray imaging 3 view nonweightbearing left foot reviewed and independently evaluated by me on 07/08/2025 demonstrates maintained alignment as well as compression following 1st through 3rd metatarsocuneiform arthrodesis with dorsal bridge plate fixation and retrograde Lisfranc screw.  Incrementally increased trabecular bridging particularly at the 1st and 2nd as well as the third at this point.  There is no samantha-implant fracture, lucency or loosening.    Assessment/Plan:  60-year-old male 1 pack/day smoker with history of diabetes and concern for neuropathy s/p left Lisfranc realignment arthrodesis from 5/9/2025 with both clinical and radiographic evidence of healing.  I would recommend the patient begin progressive weightbearing in his left lower extremity in a walking boot.  He may begin with transfers over the course of the next week he may increase his walking for shorter distances and then  2 weeks time weight-bear as tolerated in the boot.  He should continue with wound care and continue to keep me apprised of his progress.  I would plan to see the patient back no later than 1 month for repeat clinical and radiographic evaluation.  Upon return patient would require three-view weightbearing left foot.    Regan Lindquist MD, HAZEL  Department of Orthopaedic Surgery  [facility]    The diagnosis and treatment plan were reviewed with the patient. All questions were answered. The patient verbalized understanding of the treatment plan. There were no barriers to understanding identified.    Note dictated with [employer] software.  Completed without full type editing and sent to avoid delay.         [1]   Past Medical History:  Diagnosis Date    Diabetes mellitus (Multi)     a1C= 7.1% on 4/24/25    ED (erectile dysfunction)     HLD (hyperlipidemia)     Hypertension     Lisfranc dislocation, left, initial encounter     left foot    Neuropathy     N/T left foot   [2]   Social History  Socioeconomic History    Marital status:    Tobacco Use    Smoking status: Every Day     Current packs/day: 2.00     Types: Cigarettes    Smokeless tobacco: Never    Tobacco comments:     Trying to quit, down to 1/2 PPD from 2 PPD, has smoked for over 30 years   Vaping Use    Vaping status: Never Used   Substance and Sexual Activity    Alcohol use: Yes     Alcohol/week: 12.0 standard drinks of alcohol     Types: 12 Standard drinks or equivalent per week     Comment: drinks alcohol 2-3 days a week and will have 3-4 drinks    Drug use: Never    Sexual activity: Defer   [3]   Family History  Problem Relation Name Age of Onset    Diabetes Mother      Hypertension Mother      Cancer Mother      No Known Problems Father      Cancer Sister      Cancer Sister      Other (respiratory failure) Sister      Heart disease Sister     [4] No Known Allergies

## 2025-07-15 ENCOUNTER — OFFICE VISIT (OUTPATIENT)
Dept: WOUND CARE | Facility: CLINIC | Age: 60
End: 2025-07-15
Payer: COMMERCIAL

## 2025-07-15 PROCEDURE — 11042 DBRDMT SUBQ TIS 1ST 20SQCM/<: CPT

## 2025-07-22 ENCOUNTER — OFFICE VISIT (OUTPATIENT)
Dept: WOUND CARE | Facility: CLINIC | Age: 60
End: 2025-07-22
Payer: COMMERCIAL

## 2025-07-22 PROCEDURE — 11042 DBRDMT SUBQ TIS 1ST 20SQCM/<: CPT

## 2025-07-29 ENCOUNTER — OFFICE VISIT (OUTPATIENT)
Dept: WOUND CARE | Facility: CLINIC | Age: 60
End: 2025-07-29
Payer: COMMERCIAL

## 2025-07-29 PROCEDURE — 11042 DBRDMT SUBQ TIS 1ST 20SQCM/<: CPT

## 2025-08-05 ENCOUNTER — OFFICE VISIT (OUTPATIENT)
Dept: WOUND CARE | Facility: CLINIC | Age: 60
End: 2025-08-05
Payer: COMMERCIAL

## 2025-08-05 ENCOUNTER — HOSPITAL ENCOUNTER (OUTPATIENT)
Dept: RADIOLOGY | Facility: CLINIC | Age: 60
Discharge: HOME | End: 2025-08-05
Payer: COMMERCIAL

## 2025-08-05 ENCOUNTER — OFFICE VISIT (OUTPATIENT)
Dept: ORTHOPEDIC SURGERY | Facility: CLINIC | Age: 60
End: 2025-08-05
Payer: COMMERCIAL

## 2025-08-05 DIAGNOSIS — S93.325A LISFRANC DISLOCATION, LEFT, INITIAL ENCOUNTER: ICD-10-CM

## 2025-08-05 DIAGNOSIS — T14.8XXD DELAYED WOUND HEALING: ICD-10-CM

## 2025-08-05 DIAGNOSIS — S93.325A LISFRANC DISLOCATION, LEFT, INITIAL ENCOUNTER: Primary | ICD-10-CM

## 2025-08-05 PROCEDURE — 99213 OFFICE O/P EST LOW 20 MIN: CPT | Performed by: STUDENT IN AN ORGANIZED HEALTH CARE EDUCATION/TRAINING PROGRAM

## 2025-08-05 PROCEDURE — L3260 AMBULATORY SURGICAL BOOT EAC: HCPCS | Performed by: STUDENT IN AN ORGANIZED HEALTH CARE EDUCATION/TRAINING PROGRAM

## 2025-08-05 PROCEDURE — 73630 X-RAY EXAM OF FOOT: CPT | Mod: LEFT SIDE | Performed by: RADIOLOGY

## 2025-08-05 PROCEDURE — 73630 X-RAY EXAM OF FOOT: CPT | Mod: LT

## 2025-08-05 PROCEDURE — 99024 POSTOP FOLLOW-UP VISIT: CPT | Performed by: STUDENT IN AN ORGANIZED HEALTH CARE EDUCATION/TRAINING PROGRAM

## 2025-08-05 PROCEDURE — 11042 DBRDMT SUBQ TIS 1ST 20SQCM/<: CPT

## 2025-08-05 ASSESSMENT — PAIN - FUNCTIONAL ASSESSMENT: PAIN_FUNCTIONAL_ASSESSMENT: 0-10

## 2025-08-05 ASSESSMENT — PAIN DESCRIPTION - DESCRIPTORS: DESCRIPTORS: ACHING

## 2025-08-05 ASSESSMENT — PAIN SCALES - GENERAL: PAINLEVEL_OUTOF10: 2

## 2025-08-05 NOTE — PROGRESS NOTES
ORTHOPAEDIC SURGERY OUTPATIENT PROGRESS NOTE    Chief Complaint:  Left foot pain    History Of Present Illness  Ankur Sanchez is a 60 y.o. male who presents for evaluation of left foot as a referral from Dr. Larsen.  Patient sustained an injury from 04/18/2025, he tripped after getting out of his bed with immediate pain and difficulty bearing weight in the left foot.  Patient carries a diagnosis of neuropathy with diabetes.  He was seen in an ER setting where imaging was obtained including x-ray and CT that was concerning for a Lisfranc variant injury.  He later followed up with an orthopedic provider and was referred for evaluation.  He has been nonweightbearing in his left lower extremity utilizing knee scooter for assisted ambulation.  He has been taking narcotics for pain control.    Patient has DM2 patient believes his last HbA1c was approximately 6% in spring she had to, 1 pack/day smoker, no current anticoagulation.    04/29/2025: Patient returns for follow-up of his left foot injury as above.  Patient has been elevating his extremity and has cut back on smoking although has not completely stopped at this point.  He is awaiting nicotine patches to be sent by another provider.  He is reporting 6 out of 10 pain.  He has researched his injury extensively.    05/19/2025: Patient returns s/p left Lisfranc realignment arthrodesis from 5/9/2025.  Patient is reporting 6 out of 10 pain, he did have durable pain relief following postoperative block.  He has been compliant with nonweightbearing restrictions.  He unfortunately continues to smoke.    06/02/2025: Patient returns s/p left Lisfranc realignment arthrodesis from 5/9/2025.  Patient is currently reporting improved pain overall.  He has been compliant with weightbearing restrictions.  He unfortunately continues to utilize tobacco products.  He has noticed a sensation of the sutures catching as his swelling has decreased.  He has been without fevers, chills or  night sweats.    06/10/2025: Patient returns s/p left Lisfranc realignment arthrodesis from 5/9/2025.  Patient is reporting improved pain.  He has continued to elevate his extremity and has been compliant with weightbearing restrictions.  He reports to be working on tobacco cessation.  He is present with his wife today.    06/24/2025: Patient returns s/p left Lisfranc realignment arthrodesis from 5/9/2025.  He is reporting 6 out of 10 pain.  He has been elevating his extremity and maintain compliance with weightbearing restrictions.  He is scheduled for wound care later today.  He has been changing his dressings and noted decreased drainage.  He is present with his wife today.    07/08/2025: Patient returns s/p left Lisfranc realignment arthrodesis from 5/9/2025.  Patient is reporting 3 out of 10 pain.  He has been in wound care.  He has been elevating his extremity.  He has continued to make progress today.  He is present with his wife today.    08/05/2025: Patient returns s/p left Lisfranc realignment arthrodesis from 5/9/2025.  Patient is reporting 2 out of 10 pain.  He has been in wound care weekly, they have noted continued improvement.  He has been ambulating with use of a walking boot.  He has been off of work and is present with his wife today.    Past Medical History  Medical History[1]    Surgical History  Recent Surgeries in Orthopaedic Surgery            No cases to display             Social History  Social History[2]    Family History  Family History[3]     Allergies  RX Allergies[4]    Review of Systems  REVIEW OF SYSTEMS  Constitutional: no unplanned weight loss  Psychiatric: no suicidal ideation  ENT: no vision changes, no sinus problems  Pulmonary: no shortness of breath  Lymphatic: no enlarged lymph nodes  Cardiovascular: no chest pain or shortness of breath  Gastrointestinal: no stomach problems  Genitourinary: no dysuria   Skin: no rashes  Endocrine: no thyroid problems  Neurological: no  headache, no numbness  Hematological: no easy bruising  Musculoskeletal: Left foot pain     Physical Exam  PHYSICAL EXAMINATION  Constitutional Exam: well developed and well nourished  Psychiatric Exam: alert and oriented, appropriate mood and behavior  Eye Exam: EOMI  Pulmonary Exam: breathing non-labored, no apparent distress  Lymphatic exam: no appreciable lymphadenopathy in the lower extremities  Cardiovascular exam: RRR to peripheral palpation, DP pulses 2+, PT 2+, toes are pink with good capillary refill, no pitting edema  Skin exam: no open lesions, rashes, abrasions or ulcerations  Neurological exam: sensation to light touch intact in both lower extremities in peripheral and dermatomal distributions (except for any abnormalities noted in musculoskeletal exam)    Musculoskeletal exam: Left lower extremity examination.  Iliac brim incision well-healed.  Patient modified dorsal midfoot incision with no evidence of worsening appearance of central portion of the wound.  There is no samantha-incisional erythema, drainage, fluctuance.  Patient does notice drainage following weekly debridement but evident on exam today.  Notable improvement in edema overall as well. Patient has sensation retained to light touch in distal extremity with intact but pain limited PF/DF/EHL and 1+ DP/PT pulses palpated.    Last Recorded Vitals  There were no vitals taken for this visit.    Laboratory Results  No results found for this or any previous visit (from the past 24 hours).     Radiology Results  X-ray imaging 3 view weightbearing left foot reviewed from 8/5/2025 demonstrates maintained alignment following Lisfranc realignment arthrodesis with plate and screw fixation.  There is bridging trabecular bone noted.  There is no samantha-implant fracture, lucency or loosening.    Assessment/Plan:  60-year-old male 1 pack/day smoker with history of diabetes and concern for neuropathy s/p left Lisfranc realignment arthrodesis from 5/9/2025 with  both clinical and radiographic evidence of healing.  I would recommend the patient begin weightbearing to his tolerance in his left lower extremity.  He may trial a postoperative shoe.  He should continue with wound care.  We discussed that given the radiographic appearance that if he were to develop infection or persistent difficulties healing the wound that I would recommend obtaining a CT scan with consideration for hardware removal.  The patient will notify the office if he develops any increased pain, drainage, fevers, chills or night sweats.  I would plan to see the patient back no later than 6 weeks for repeat clinical and radiographic evaluation.  Long-term patient require three-view weightbearing left foot.    Regan Lindquist MD, HAZEL  Department of Orthopaedic Surgery  [facility]    The diagnosis and treatment plan were reviewed with the patient. All questions were answered. The patient verbalized understanding of the treatment plan. There were no barriers to understanding identified.    Note dictated with [employer] software.  Completed without full type editing and sent to avoid delay.         [1]   Past Medical History:  Diagnosis Date    Diabetes mellitus (Multi)     a1C= 7.1% on 4/24/25    ED (erectile dysfunction)     HLD (hyperlipidemia)     Hypertension     Lisfranc dislocation, left, initial encounter     left foot    Neuropathy     N/T left foot   [2]   Social History  Socioeconomic History    Marital status:    Tobacco Use    Smoking status: Every Day     Current packs/day: 2.00     Types: Cigarettes    Smokeless tobacco: Never    Tobacco comments:     Trying to quit, down to 1/2 PPD from 2 PPD, has smoked for over 30 years   Vaping Use    Vaping status: Never Used   Substance and Sexual Activity    Alcohol use: Yes     Alcohol/week: 12.0 standard drinks of alcohol     Types: 12 Standard drinks or equivalent per week     Comment:  drinks alcohol 2-3 days a week and will have 3-4 drinks    Drug use: Never    Sexual activity: Defer   [3]   Family History  Problem Relation Name Age of Onset    Diabetes Mother      Hypertension Mother      Cancer Mother      No Known Problems Father      Cancer Sister      Cancer Sister      Other (respiratory failure) Sister      Heart disease Sister     [4] No Known Allergies

## 2025-08-05 NOTE — LETTER
August 5, 2025     Patient: Ankur Sanchez   YOB: 1965   Date of Visit: 8/5/2025       To Whom It May Concern:    It is my medical opinion that Ankur Sanchez may return to work on 8-11-25. 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 No heavy equipment operation at this time. We will see him back for follow up on 9-23-25.    If you have any questions or concerns, please don't hesitate to call.         Sincerely,        Regan Lindquist MD    CC: No Recipients

## 2025-08-05 NOTE — LETTER
August 5, 2025     Patient: Ankur Sanchez   YOB: 1965   Date of Visit: 8/5/2025       To Whom It May Concern:    It is my medical opinion that Ankur Sanchez may return to work on 8-11-25. Light duty only, primarily seated throughout the day with his left foot elevated as much as possible. We will see him in clinic for follow up on 9- .    If you have any questions or concerns, please don't hesitate to call.         Sincerely,        Regan Lindquist MD    CC: No Recipients

## 2025-08-12 ENCOUNTER — OFFICE VISIT (OUTPATIENT)
Dept: WOUND CARE | Facility: CLINIC | Age: 60
End: 2025-08-12
Payer: COMMERCIAL

## 2025-08-12 PROCEDURE — 11042 DBRDMT SUBQ TIS 1ST 20SQCM/<: CPT

## 2025-08-15 ENCOUNTER — APPOINTMENT (OUTPATIENT)
Dept: WOUND CARE | Facility: CLINIC | Age: 60
End: 2025-08-15
Payer: COMMERCIAL

## 2025-08-19 ENCOUNTER — OFFICE VISIT (OUTPATIENT)
Dept: WOUND CARE | Facility: CLINIC | Age: 60
End: 2025-08-19
Payer: COMMERCIAL

## 2025-08-19 PROCEDURE — 97605 NEG PRS WND THER DME<=50SQCM: CPT

## 2025-08-19 PROCEDURE — 11042 DBRDMT SUBQ TIS 1ST 20SQCM/<: CPT

## 2025-08-22 ENCOUNTER — APPOINTMENT (OUTPATIENT)
Dept: WOUND CARE | Facility: CLINIC | Age: 60
End: 2025-08-22
Payer: COMMERCIAL

## 2025-08-26 ENCOUNTER — OFFICE VISIT (OUTPATIENT)
Dept: WOUND CARE | Facility: CLINIC | Age: 60
End: 2025-08-26
Payer: COMMERCIAL

## 2025-08-26 PROCEDURE — 11042 DBRDMT SUBQ TIS 1ST 20SQCM/<: CPT

## 2025-09-02 ENCOUNTER — OFFICE VISIT (OUTPATIENT)
Dept: WOUND CARE | Facility: CLINIC | Age: 60
End: 2025-09-02
Payer: COMMERCIAL

## 2025-09-02 PROCEDURE — 11042 DBRDMT SUBQ TIS 1ST 20SQCM/<: CPT

## (undated) DEVICE — Device

## (undated) DEVICE — SUTURE, VICRYL, 2-0, 27 IN, CT-2, VIOLET

## (undated) DEVICE — DRILL BIT, CANN, 2.7 TWIST W/AO

## (undated) DEVICE — SUTURE, ETHILON, 4-0, BLK, MONO, PS-2 18

## (undated) DEVICE — DRAPE, INCISE, ANTIMICROBIAL, IOBAN 2, LARGE, 17 X 23 IN, DISPOSABLE, STERILE

## (undated) DEVICE — SUTURE, ETHILON, 3-0, 30 IN, FS-1, BLACK

## (undated) DEVICE — DRESSING, GAUZE, PETROLATUM, PATCH, XEROFORM, 1 X 8 IN, STERILE

## (undated) DEVICE — COVER, C-ARM W/CLIPS, OEC GE

## (undated) DEVICE — GLOVE, SURGICAL, PROTEXIS PI BLUE W/NEUTHERA, 7.5, PF, LF

## (undated) DEVICE — SUTURE, MONOCRYL, 4-0, 18 IN, PS2, UNDYED

## (undated) DEVICE — DRAPE COVER, C ARM, FLOUROSCAN IMAGING SYS

## (undated) DEVICE — BUR, ROUND, 4.0MM X 54.0 MM

## (undated) DEVICE — CAUTERY, PENCIL, PUSH BUTTON, SMOKE EVAC, 70MM

## (undated) DEVICE — TOWEL, SURGICAL, NEURO, O/R, 16 X 26, BLUE, STERILE

## (undated) DEVICE — BANDAGE, ESMARK, 6 IN X 9 FT, STERILE

## (undated) DEVICE — K WIRE, 1.25 X 150MM

## (undated) DEVICE — BANDAGE, COFLEX, 6 X 5 YDS, FOAM TAN, STERILE, LF

## (undated) DEVICE — CUFF, TOURNIQUET, 30 X 4, DUAL PORT/SNGL BLADDER, DISP, LF

## (undated) DEVICE — SUTURE, MONOCRYL, 3-0, 18 IN, PS2, UNDYED

## (undated) DEVICE — DRILL BIT, AO, 2.0 X 105MM, SCALED

## (undated) DEVICE — PADDING, WEBRIL, UNDERCAST, STERILE, 6 IN

## (undated) DEVICE — GLOVE, SURGICAL, PROTEXIS PI ORTHO, 7.5, PF, LF

## (undated) DEVICE — SUTURE, MONOCRYL, 3-0, 27 IN, PS-2, UNDYED

## (undated) DEVICE — APPLICATOR, CHLORAPREP, W/ORANGE TINT, 26ML

## (undated) DEVICE — KIT, CONVIENCE PREP, ERYTHROCYTE/ MARROW